# Patient Record
Sex: MALE | Race: WHITE | URBAN - METROPOLITAN AREA
[De-identification: names, ages, dates, MRNs, and addresses within clinical notes are randomized per-mention and may not be internally consistent; named-entity substitution may affect disease eponyms.]

---

## 2020-03-09 ENCOUNTER — OFFICE VISIT (OUTPATIENT)
Dept: FAMILY MEDICINE CLINIC | Age: 70
End: 2020-03-09

## 2020-03-09 ENCOUNTER — HOSPITAL ENCOUNTER (OUTPATIENT)
Dept: LAB | Age: 70
Discharge: HOME | End: 2020-03-09
Payer: MEDICARE

## 2020-03-09 VITALS
HEIGHT: 62 IN | SYSTOLIC BLOOD PRESSURE: 147 MMHG | DIASTOLIC BLOOD PRESSURE: 78 MMHG | TEMPERATURE: 97 F | HEART RATE: 63 BPM | BODY MASS INDEX: 34.96 KG/M2 | OXYGEN SATURATION: 97 % | RESPIRATION RATE: 16 BRPM | WEIGHT: 190 LBS

## 2020-03-09 DIAGNOSIS — Z13.220 LIPID SCREENING: ICD-10-CM

## 2020-03-09 DIAGNOSIS — I10 ESSENTIAL HYPERTENSION: ICD-10-CM

## 2020-03-09 DIAGNOSIS — I10 ESSENTIAL HYPERTENSION: Primary | ICD-10-CM

## 2020-03-09 DIAGNOSIS — K21.9 GASTROESOPHAGEAL REFLUX DISEASE, ESOPHAGITIS PRESENCE NOT SPECIFIED: ICD-10-CM

## 2020-03-09 DIAGNOSIS — Z12.11 SCREEN FOR COLON CANCER: ICD-10-CM

## 2020-03-09 LAB
ALBUMIN SERPL-MCNC: 3.6 G/DL (ref 3.4–5)
ALBUMIN/GLOB SERPL: 1 {RATIO} (ref 0.8–1.7)
ALP SERPL-CCNC: 78 U/L (ref 45–117)
ALT SERPL-CCNC: 57 U/L (ref 16–61)
ANION GAP SERPL CALC-SCNC: 3 MMOL/L (ref 3–18)
AST SERPL-CCNC: 35 U/L (ref 10–38)
BILIRUB SERPL-MCNC: 0.4 MG/DL (ref 0.2–1)
BUN SERPL-MCNC: 16 MG/DL (ref 7–18)
BUN/CREAT SERPL: 12 (ref 12–20)
CALCIUM SERPL-MCNC: 8.9 MG/DL (ref 8.5–10.1)
CHLORIDE SERPL-SCNC: 103 MMOL/L (ref 100–111)
CHOLEST SERPL-MCNC: 181 MG/DL
CO2 SERPL-SCNC: 31 MMOL/L (ref 21–32)
CREAT SERPL-MCNC: 1.35 MG/DL (ref 0.6–1.3)
GLOBULIN SER CALC-MCNC: 3.6 G/DL (ref 2–4)
GLUCOSE SERPL-MCNC: 91 MG/DL (ref 74–99)
HDLC SERPL-MCNC: 37 MG/DL (ref 40–60)
HDLC SERPL: 4.9 {RATIO} (ref 0–5)
LDLC SERPL CALC-MCNC: 114 MG/DL (ref 0–100)
LIPID PROFILE,FLP: ABNORMAL
POTASSIUM SERPL-SCNC: 4.4 MMOL/L (ref 3.5–5.5)
PROT SERPL-MCNC: 7.2 G/DL (ref 6.4–8.2)
SODIUM SERPL-SCNC: 137 MMOL/L (ref 136–145)
TRIGL SERPL-MCNC: 150 MG/DL (ref ?–150)
VLDLC SERPL CALC-MCNC: 30 MG/DL

## 2020-03-09 PROCEDURE — 80061 LIPID PANEL: CPT

## 2020-03-09 PROCEDURE — 36415 COLL VENOUS BLD VENIPUNCTURE: CPT

## 2020-03-09 PROCEDURE — 80053 COMPREHEN METABOLIC PANEL: CPT

## 2020-03-09 RX ORDER — HYDROCHLOROTHIAZIDE 25 MG/1
TABLET ORAL
COMMUNITY
Start: 2020-01-05 | End: 2020-03-09 | Stop reason: SDUPTHER

## 2020-03-09 RX ORDER — OMEPRAZOLE 40 MG/1
CAPSULE, DELAYED RELEASE ORAL
COMMUNITY
Start: 2020-01-05 | End: 2020-03-09 | Stop reason: SDUPTHER

## 2020-03-09 RX ORDER — HYDROCHLOROTHIAZIDE 25 MG/1
25 TABLET ORAL DAILY
Qty: 90 TAB | Refills: 1 | Status: SHIPPED | OUTPATIENT
Start: 2020-03-09 | End: 2020-06-09 | Stop reason: SDUPTHER

## 2020-03-09 RX ORDER — LISINOPRIL 40 MG/1
TABLET ORAL
COMMUNITY
Start: 2019-12-05 | End: 2020-03-09 | Stop reason: SDUPTHER

## 2020-03-09 RX ORDER — LISINOPRIL 40 MG/1
40 TABLET ORAL DAILY
Qty: 90 TAB | Refills: 1 | Status: SHIPPED | OUTPATIENT
Start: 2020-03-09 | End: 2020-06-09 | Stop reason: SDUPTHER

## 2020-03-09 RX ORDER — OMEPRAZOLE 40 MG/1
40 CAPSULE, DELAYED RELEASE ORAL DAILY
Qty: 90 CAP | Refills: 1 | Status: SHIPPED | OUTPATIENT
Start: 2020-03-09 | End: 2020-06-09 | Stop reason: SDUPTHER

## 2020-03-09 NOTE — PATIENT INSTRUCTIONS
DASH Diet: Care Instructions  Your Care Instructions    The DASH diet is an eating plan that can help lower your blood pressure. DASH stands for Dietary Approaches to Stop Hypertension. Hypertension is high blood pressure. The DASH diet focuses on eating foods that are high in calcium, potassium, and magnesium. These nutrients can lower blood pressure. The foods that are highest in these nutrients are fruits, vegetables, low-fat dairy products, nuts, seeds, and legumes. But taking calcium, potassium, and magnesium supplements instead of eating foods that are high in those nutrients does not have the same effect. The DASH diet also includes whole grains, fish, and poultry. The DASH diet is one of several lifestyle changes your doctor may recommend to lower your high blood pressure. Your doctor may also want you to decrease the amount of sodium in your diet. Lowering sodium while following the DASH diet can lower blood pressure even further than just the DASH diet alone. Follow-up care is a key part of your treatment and safety. Be sure to make and go to all appointments, and call your doctor if you are having problems. It's also a good idea to know your test results and keep a list of the medicines you take. How can you care for yourself at home? Following the DASH diet  · Eat 4 to 5 servings of fruit each day. A serving is 1 medium-sized piece of fruit, ½ cup chopped or canned fruit, 1/4 cup dried fruit, or 4 ounces (½ cup) of fruit juice. Choose fruit more often than fruit juice. · Eat 4 to 5 servings of vegetables each day. A serving is 1 cup of lettuce or raw leafy vegetables, ½ cup of chopped or cooked vegetables, or 4 ounces (½ cup) of vegetable juice. Choose vegetables more often than vegetable juice. · Get 2 to 3 servings of low-fat and fat-free dairy each day. A serving is 8 ounces of milk, 1 cup of yogurt, or 1 ½ ounces of cheese. · Eat 6 to 8 servings of grains each day.  A serving is 1 slice of bread, 1 ounce of dry cereal, or ½ cup of cooked rice, pasta, or cooked cereal. Try to choose whole-grain products as much as possible. · Limit lean meat, poultry, and fish to 2 servings each day. A serving is 3 ounces, about the size of a deck of cards. · Eat 4 to 5 servings of nuts, seeds, and legumes (cooked dried beans, lentils, and split peas) each week. A serving is 1/3 cup of nuts, 2 tablespoons of seeds, or ½ cup of cooked beans or peas. · Limit fats and oils to 2 to 3 servings each day. A serving is 1 teaspoon of vegetable oil or 2 tablespoons of salad dressing. · Limit sweets and added sugars to 5 servings or less a week. A serving is 1 tablespoon jelly or jam, ½ cup sorbet, or 1 cup of lemonade. · Eat less than 2,300 milligrams (mg) of sodium a day. If you limit your sodium to 1,500 mg a day, you can lower your blood pressure even more. Tips for success  · Start small. Do not try to make dramatic changes to your diet all at once. You might feel that you are missing out on your favorite foods and then be more likely to not follow the plan. Make small changes, and stick with them. Once those changes become habit, add a few more changes. · Try some of the following:  ? Make it a goal to eat a fruit or vegetable at every meal and at snacks. This will make it easy to get the recommended amount of fruits and vegetables each day. ? Try yogurt topped with fruit and nuts for a snack or healthy dessert. ? Add lettuce, tomato, cucumber, and onion to sandwiches. ? Combine a ready-made pizza crust with low-fat mozzarella cheese and lots of vegetable toppings. Try using tomatoes, squash, spinach, broccoli, carrots, cauliflower, and onions. ? Have a variety of cut-up vegetables with a low-fat dip as an appetizer instead of chips and dip. ? Sprinkle sunflower seeds or chopped almonds over salads. Or try adding chopped walnuts or almonds to cooked vegetables.   ? Try some vegetarian meals using beans and peas. Add garbanzo or kidney beans to salads. Make burritos and tacos with mashed gonzalez beans or black beans. Where can you learn more? Go to http://crys-augusta.info/. Enter N946 in the search box to learn more about \"DASH Diet: Care Instructions. \"  Current as of: April 9, 2019  Content Version: 12.2  © 8099-0805 [employer]. Care instructions adapted under license by [employer] (which disclaims liability or warranty for this information). If you have questions about a medical condition or this instruction, always ask your healthcare professional. [address] any warranty or liability for your use of this information.

## 2020-03-09 NOTE — PROGRESS NOTES
[address] [place] [address]               [phone]      Preeti Figueroa is a 78 y.o. male and presents with Establish Care and Medication Refill       Assessment/Plan:    Diagnoses and all orders for this visit:    1. Essential hypertension  -     hydroCHLOROthiazide (HYDRODIURIL) 25 mg tablet; Take 1 Tab by mouth daily. -     lisinopriL (PRINIVIL, ZESTRIL) 40 mg tablet; Take 1 Tab by mouth daily.  -     METABOLIC PANEL, COMPREHENSIVE; Future  Here today to establish care and address acute and chronic medical conditions  He did run out of his medications and missed a couple days, states he borrowed a friend's lisinopril, states that lisinopril was 5 mg tablets therefore he took 8 of them  Blood pressure today 147/78, will continue to monitor at this time  Medication refills given  Labs ordered to monitor hepatic and renal functions    2. Gastroesophageal reflux disease, esophagitis presence not specified  -     omeprazole (PRILOSEC) 40 mg capsule; Take 1 Cap by mouth daily. Medication refill given    3. Screen for colon cancer  -     REFERRAL TO GASTROENTEROLOGY  He is due for a colon cancer screening, referral to gastroenterology ordered    4. Lipid screening  -     LIPID PANEL; Future  Due for lipid screening, labs ordered      Follow-up and Dispositions    · Return in about 3 months (around 6/9/2020) for Medicare annual wellness exam, hypertension, 30 minutes. Subjective:    Was previously living in [place]. Wife recently passed away, so he moved back to [place] where he has family for support. Hypertension:   Patient reports taking medications as instructed. yes   Medication side effects noted. no  Headache upon wakening. no   Home BP monitoring in range of 301/96'T systolic. Denies chest pain/pressure or SOB with exertion. ROS:     Review of Systems   Constitutional: Negative.     HENT:        Wears Dentures   Eyes:        Wear corrective lenses   Respiratory: Negative. Cardiovascular: Negative. Gastrointestinal: Negative. PMH: GERD  Last colonoscopy >10 years   Genitourinary: Positive for frequency. Urinates about every two hours, empties his bladder completely, does not interfere with his daily life   Musculoskeletal: Positive for back pain. 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 Neurological: Negative. Psychiatric/Behavioral: Negative. The problem list was updated as a part of today's visit. Patient Active Problem List   Diagnosis Code    Hypertension I10    GERD (gastroesophageal reflux disease) K21.9       The PSH, FH were reviewed. SH:  Social History     Tobacco Use    Smoking status: Former Smoker     Packs/day: 2.00     Years: 25.00     Pack years: 50.00   Substance Use Topics    Alcohol use: Yes     Comment: Juan Edgar     Drug use: Not Currently       Medications/Allergies:  Current Outpatient Medications on File Prior to Visit   Medication Sig Dispense Refill    [DISCONTINUED] hydroCHLOROthiazide (HYDRODIURIL) 25 mg tablet       [DISCONTINUED] lisinopriL (PRINIVIL, ZESTRIL) 40 mg tablet       [DISCONTINUED] omeprazole (PRILOSEC) 40 mg capsule        No current facility-administered medications on file prior to visit. Not on File    Objective:  Visit Vitals  /78   Pulse 63   Temp 97 °F (36.1 °C) (Oral)   Resp 16   Ht 5' 2\" (1.575 m)   Wt 190 lb (86.2 kg)   SpO2 97%   BMI 34.75 kg/m²    Body mass index is 34.75 kg/m². Physical assessment  Physical Exam  Vitals signs and nursing note reviewed. Constitutional:       Appearance: Normal appearance. HENT:      Head: Normocephalic and atraumatic.       Right Ear: Hearing, tympanic membrane, ear canal and external ear normal.      Left Ear: Hearing, tympanic membrane, ear canal and external ear normal. Nose: Nose normal.      Mouth/Throat:      Pharynx: Uvula midline. Eyes:      General: Lids are normal.      Conjunctiva/sclera: Conjunctivae normal.      Pupils: Pupils are equal, round, and reactive to light. Neck:      Musculoskeletal: Normal range of motion and neck supple. Thyroid: No thyroid mass or thyromegaly. Vascular: No carotid bruit or JVD. Trachea: Trachea normal. No tracheal deviation. Cardiovascular:      Rate and Rhythm: Normal rate and regular rhythm. Heart sounds: Normal heart sounds, S1 normal and S2 normal.   Pulmonary:      Effort: Pulmonary effort is normal.      Breath sounds: Normal breath sounds. Abdominal:      General: Bowel sounds are normal.      Palpations: Abdomen is soft. Tenderness: There is no abdominal tenderness. Musculoskeletal: Normal range of motion. Lymphadenopathy:      Head:      Right side of head: No submental, submandibular, tonsillar, preauricular, posterior auricular or occipital adenopathy. Left side of head: No submental, submandibular, tonsillar, preauricular, posterior auricular or occipital adenopathy. Cervical: No cervical adenopathy. Skin:     General: Skin is warm and dry. Neurological:      Mental Status: He is alert and oriented to person, place, and time. Motor: Motor function is intact. Gait: Gait is intact.    Psychiatric:         Mood and Affect: Mood and affect normal.         Cognition and Memory: Memory normal.         Judgment: Judgment normal.           Labwork and Ancillary Studies:    CBC w/Diff  No results found for: WBC, WBCLT, HGB, HGBP, PLT, HGBEXT, PLTEXT, HGBEXT, PLTEXT      Basic Metabolic Profile  Lab Results   Component Value Date/Time    Sodium 137 03/09/2020 10:23 AM    Potassium 4.4 03/09/2020 10:23 AM    Chloride 103 03/09/2020 10:23 AM    CO2 31 03/09/2020 10:23 AM    Anion gap 3 03/09/2020 10:23 AM    Glucose 91 03/09/2020 10:23 AM    BUN 16 03/09/2020 10:23 AM    Creatinine 1.35 (H) 03/09/2020 10:23 AM    BUN/Creatinine ratio 12 03/09/2020 10:23 AM    GFR est AA >60 03/09/2020 10:23 AM    GFR est non-AA 52 (L) 03/09/2020 10:23 AM    Calcium 8.9 03/09/2020 10:23 AM        Cholesterol  Lab Results   Component Value Date/Time    Cholesterol, total 181 03/09/2020 10:23 AM    HDL Cholesterol 37 (L) 03/09/2020 10:23 AM    LDL, calculated 114 (H) 03/09/2020 10:23 AM    Triglyceride 150 (H) 03/09/2020 10:23 AM    CHOL/HDL Ratio 4.9 03/09/2020 10:23 AM       Health Maintenance:   Health Maintenance   Topic Date Due    Hepatitis C Screening  1950    DTaP/Tdap/Td series (1 - Tdap) 12/16/1960    Lipid Screen  12/16/1989    Shingrix Vaccine Age 50> (1 of 2) 12/16/1999    FOBT Q1Y Age 54-65  12/16/1999    GLAUCOMA SCREENING Q2Y  12/16/2014    AAA Screening 73-67 YO Male Smoking Patients  12/16/2014    Pneumococcal 65+ years (1 of 1 - PPSV23) 12/16/2014    Influenza Age 5 to Adult  08/01/2019    Medicare Yearly Exam  03/09/2020       I have discussed the diagnosis with the patient and the intended plan as seen in the above orders. The patient has received an After-Visit Summary and questions were answered concerning future plans. An After Visit Summary was printed and given to the patient. All diagnosis have been discussed with the patient and all of the patient's questions have been answered. Follow-up and Dispositions    · Return in about 3 months (around 6/9/2020) for Medicare annual wellness exam, hypertension, 30 minutes. Rika Garcia, LIBIAP-BC  [address] [facility]   [address]  [address]

## 2020-03-09 NOTE — PROGRESS NOTES
Chief Complaint   Patient presents with   [facility] Establish Care    Medication Refill     1. Have you been to the ER, urgent care clinic since your last visit? Hospitalized since your last visit? 2. Have you seen or consulted any other health care providers outside of the [address] [place] [address] since your last visit? Include any pap smears or colon screening.

## 2020-06-09 ENCOUNTER — VIRTUAL VISIT (OUTPATIENT)
Dept: FAMILY MEDICINE CLINIC | Age: 70
End: 2020-06-09

## 2020-06-09 DIAGNOSIS — Z71.89 ADVANCED CARE PLANNING/COUNSELING DISCUSSION: ICD-10-CM

## 2020-06-09 DIAGNOSIS — Z00.00 MEDICARE ANNUAL WELLNESS VISIT, INITIAL: Primary | ICD-10-CM

## 2020-06-09 DIAGNOSIS — Z13.6 SCREENING FOR CARDIOVASCULAR CONDITION: ICD-10-CM

## 2020-06-09 DIAGNOSIS — K21.9 GASTROESOPHAGEAL REFLUX DISEASE, ESOPHAGITIS PRESENCE NOT SPECIFIED: ICD-10-CM

## 2020-06-09 DIAGNOSIS — I10 ESSENTIAL HYPERTENSION: ICD-10-CM

## 2020-06-09 RX ORDER — OMEPRAZOLE 40 MG/1
40 CAPSULE, DELAYED RELEASE ORAL DAILY
Qty: 90 CAP | Refills: 1 | Status: SHIPPED | OUTPATIENT
Start: 2020-06-09 | End: 2021-02-08 | Stop reason: SDUPTHER

## 2020-06-09 RX ORDER — HYDROCHLOROTHIAZIDE 25 MG/1
25 TABLET ORAL DAILY
Qty: 90 TAB | Refills: 1 | Status: SHIPPED | OUTPATIENT
Start: 2020-06-09 | End: 2021-03-08 | Stop reason: SDUPTHER

## 2020-06-09 RX ORDER — LISINOPRIL 40 MG/1
40 TABLET ORAL DAILY
Qty: 90 TAB | Refills: 1 | Status: SHIPPED | OUTPATIENT
Start: 2020-06-09 | End: 2021-02-08 | Stop reason: SDUPTHER

## 2020-06-09 NOTE — PROGRESS NOTES
Johnny               [address]               [phone]      Cem Alexander is a 78 y.o. male who was seen by synchronous (real-time) audio-video technology on 6/9/2020. Consent: Cem Alexander, who was seen by synchronous (real-time) audio-video technology, and/or his healthcare decision maker, is aware that this patient-initiated, Telehealth encounter on 6/9/2020 is a billable service, with coverage as determined by his insurance carrier. He is aware that he may receive a bill and has provided verbal consent to proceed: Yes. Assessment & Plan:   Diagnoses and all orders for this visit:    1. Medicare annual wellness visit, initial  Completed today to include ETOH and depression screening    2. Advanced care planning/counseling discussion  Discussion completed, recommend completion of ACP    3. Essential hypertension  -     hydroCHLOROthiazide (HYDRODIURIL) 25 mg tablet; Take 1 Tab by mouth daily. -     lisinopriL (PRINIVIL, ZESTRIL) 40 mg tablet; Take 1 Tab by mouth daily. Stable per home readings  Continue same med  Refills provided    4. Gastroesophageal reflux disease, esophagitis presence not specified  -     omeprazole (PRILOSEC) 40 mg capsule; Take 1 Cap by mouth daily. GERD controlled with prilosec  Refill provided    5. Screening for cardiovascular condition  -     US EXAM SCREENING AAA; Future  Prior smoker, AAA screen health maintenance need address  [address] [place] [address],[address]d ordered    Follow-up and Dispositions    · Return in about 3 months (around 9/9/2020) for hypertension, hyperlipidemia, office visit, 30 minutes.        Health Maintenance Due    Topic Date Due     Hepatitis C Screening  1950 Next visit    Shingrix Vaccine Age 50> (1 of 2) 12/16/1999 Next visit    FOBT Q1Y Age 54-65  12/16/1999 Scheduled 7/15/20    GLAUCOMA SCREENING Q2Y  12/16/2014 delayed due to [place], he is checking when the eye exams are available    AAA Screening 73-69 YO Male Smoking Patients  12/16/2014 Ordered today    Medicare Yearly Exam  03/09/2020 Completed today    Pneumococcal 65+ years (2 of 2 - PPSV23) 04/07/2020 Next visit        712  Subjective:   Chicho Doran is a 78 y.o. male who was seen for   Follow Up Chronic Condition; Hypertension; and GERD    Hypertension:   Patient reports taking medications as instructed. yes   Medication side effects noted. no  Headache upon wakening. no   Home BP monitoring in range of 986/70'H systolic. Do you experience chest pain/pressure or SOB with exertion? no  Maintain a low salt diet? yes  Key CAD CHF Meds             hydroCHLOROthiazide (HYDRODIURIL) 25 mg tablet (Taking) Take 1 Tab by mouth daily. lisinopriL (PRINIVIL, ZESTRIL) 40 mg tablet (Taking) Take 1 Tab by mouth daily. Prior to Admission medications    Medication Sig Start Date End Date Taking? Authorizing Provider   hydroCHLOROthiazide (HYDRODIURIL) 25 mg tablet Take 1 Tab by mouth daily. 6/9/20  Yes Urban Mesa NP   lisinopriL (PRINIVIL, ZESTRIL) 40 mg tablet Take 1 Tab by mouth daily. 6/9/20  Yes Urban Mesa NP   omeprazole (PRILOSEC) 40 mg capsule Take 1 Cap by mouth daily. 6/9/20  Yes Urban Mesa NP   hydroCHLOROthiazide (HYDRODIURIL) 25 mg tablet Take 1 Tab by mouth daily. 3/9/20 6/9/20  Urban Mesa NP   lisinopriL (PRINIVIL, ZESTRIL) 40 mg tablet Take 1 Tab by mouth daily. 3/9/20 6/9/20  Urban Mesa NP   omeprazole (PRILOSEC) 40 mg capsule Take 1 Cap by mouth daily.  3/9/20 6/9/20  Urban Mesa NP     No Known Allergies    Patient Active Problem List   Diagnosis Code    Hypertension I10    GERD (gastroesophageal reflux disease) K21.9     Patient Active Problem List    Diagnosis Date Noted    Hypertension 03/09/2020    GERD (gastroesophageal reflux disease) 03/09/2020     Current Outpatient Medications   Medication Sig Dispense Refill    hydroCHLOROthiazide (HYDRODIURIL) 25 mg tablet Take 1 Tab by mouth daily. 90 Tab 1    lisinopriL (PRINIVIL, ZESTRIL) 40 mg tablet Take 1 Tab by mouth daily. 90 Tab 1    omeprazole (PRILOSEC) 40 mg capsule Take 1 Cap by mouth daily. 90 Cap 1     No Known Allergies  Past Medical History:   Diagnosis Date    GERD (gastroesophageal reflux disease)     Hypertension      Past Surgical History:   Procedure Laterality Date    HX ORTHOPAEDIC      knee rt 2018     Family History   Problem Relation Age of Onset    No Known Problems Mother     No Known Problems Father      Social History     Tobacco Use    Smoking status: Former Smoker     Packs/day: 2.00     Years: 25.00     Pack years: 50.00    Smokeless tobacco: Never Used   Substance Use Topics    Alcohol use: Yes     Comment: Juan Edgar        ROS  As stated in HPI, otherwise all others negative. Objective: There were no vitals taken for this visit. General: alert, cooperative, no distress   Mental  status: normal mood, behavior, speech, dress, motor activity, and thought processes, able to follow commands   HENT: NCAT   Neck: no visualized mass   Resp: no respiratory distress   Neuro: no gross deficits   Skin: no discoloration or lesions of concern on visible areas   Psychiatric: normal affect, consistent with stated mood, no evidence of hallucinations     Additional exam findings: We discussed the expected course, resolution and complications of the diagnosis(es) in detail. Medication risks, benefits, costs, interactions, and alternatives were discussed as indicated. I advised him to contact the office if his condition worsens, changes or fails to improve as anticipated. He expressed understanding with the diagnosis(es) and plan. Antonia Camargo is a 78 y.o. male who was evaluated by a video visit encounter for concerns as above. Patient identification was verified prior to start of the visit. A caregiver was present when appropriate.  Due to this being a TeleHealth encounter (During [facility]-28 public health emergency), evaluation of the following organ systems was limited: Vitals/Constitutional/EENT/Resp/CV/GI//MS/Neuro/Skin/Heme-Lymph-Imm. Pursuant to the emergency declaration under the [facility], [facility] waiver authority and the [employer] and [employer] Act, this Virtual  Visit was conducted, with patient's (and/or legal guardian's) consent, to reduce the patient's risk of exposure to COVID-19 and provide necessary medical care. Services were provided through a video synchronous discussion virtually to substitute for in-person clinic visit. Patient and provider were located at their individual homes. An After Visit Summary was printed and given to the patient. All diagnosis have been discussed with the patient and all of the patient's questions have been answered. Follow-up and Dispositions    · Return in about 3 months (around 9/9/2020) for hypertension, hyperlipidemia, office visit, 30 minutes. Gerson Villegas [address]    This is an Initial Medicare Annual Wellness Exam (AWV) (Performed 12 months after IPPE or effective date of Medicare Part B enrollment, Once in a lifetime)    Consent: Guilherme Navarrete, who was seen by synchronous (real-time) audio-video technology, and/or his healthcare decision maker, is aware that this patient-initiated, Telehealth encounter on 6/9/2020 is a billable service. While AWVs are fully covered by Medicare, any services rendered on this date that are not included in an AWV are subject to additional billing, with coverage as determined by his insurance carrier. He is aware that he may receive a bill for any such additional services and has provided verbal consent to proceed: Yes. I have reviewed the patient's medical history in detail and updated the computerized patient record. History     Patient Active Problem List   Diagnosis Code    Hypertension I10    GERD (gastroesophageal reflux disease) K21.9     Past Medical History:   Diagnosis Date    GERD (gastroesophageal reflux disease)     Hypertension       Past Surgical History:   Procedure Laterality Date    HX ORTHOPAEDIC      knee rt 2018     Current Outpatient Medications   Medication Sig Dispense Refill    hydroCHLOROthiazide (HYDRODIURIL) 25 mg tablet Take 1 Tab by mouth daily. 90 Tab 1    lisinopriL (PRINIVIL, ZESTRIL) 40 mg tablet Take 1 Tab by mouth daily. 90 Tab 1    omeprazole (PRILOSEC) 40 mg capsule Take 1 Cap by mouth daily. 90 Cap 1     No Known Allergies    Family History   Problem Relation Age of Onset    No Known Problems Mother     No Known Problems Father      Social History     Tobacco Use    Smoking status: Former Smoker     Packs/day: 2.00     Years: 25.00     Pack years: 50.00    Smokeless tobacco: Never Used   Substance Use Topics    Alcohol use: Yes     Comment: Juan Edgar        Depression Risk Factor Screening:     3 most recent PHQ Screens 6/9/2020   Little interest or pleasure in doing things Not at all   Feeling down, depressed, irritable, or hopeless Not at all   Total Score PHQ 2 0       Alcohol Risk Factor Screening (MALE > 65): Do you average more 1 drink per night or more than 7 drinks a week: Yes    In the past three months have you have had more than 4 drinks containing alcohol on one occasion: No      Functional Ability and Level of Safety:   Hearing: Hearing is good. Activities of Daily Living: The home contains: handrails and grab bars  Patient does total self care     Ambulation: with no difficulty    Fall Risk:  Fall Risk Assessment, last 12 mths 6/9/2020   Able to walk? Yes   Fall in past 12 months?  No       Abuse Screen:   Patient is not abused    Cognitive Screening   Has your family/caregiver stated any concerns about your memory: no  Cognitive Screening: Normal - Clock Drawing Test             Patient Care Team   Patient Care Team:  Pieter Ellis NP as PCP - General (Nurse Practitioner)  Pieter Ellis NP [facility] PCP [facility]    Assessment/Plan   Education and counseling provided:  Are appropriate based on today's review and evaluation  End-of-Life planning (with patient's consent)    Diagnoses and all orders for this visit:    1. Medicare annual wellness visit, initial    2. Advanced care planning/counseling discussion    3. Essential hypertension  -     hydroCHLOROthiazide (HYDRODIURIL) 25 mg tablet; Take 1 Tab by mouth daily. -     lisinopriL (PRINIVIL, ZESTRIL) 40 mg tablet; Take 1 Tab by mouth daily. 4. Gastroesophageal reflux disease, esophagitis presence not specified  -     omeprazole (PRILOSEC) 40 mg capsule; Take 1 Cap by mouth daily. 5. Screening for cardiovascular condition  -     US EXAM SCREENING AAA; Future         Health Maintenance Due   Topic Date Due    Hepatitis C Screening  1950    Shingrix Vaccine Age 50> (1 of 2) 12/16/1999    FOBT Q1Y Age 54-65  12/16/1999    GLAUCOMA SCREENING Q2Y  12/16/2014    AAA Screening 73-67 YO Male Smoking Patients  12/16/2014    Medicare Yearly Exam  03/09/2020    Pneumococcal 65+ years (2 of 2 - PPSV23) 04/07/2020         Frank Cash is a 78 y.o. male who was evaluated by an audio-video encounter for concerns as above. Patient identification was verified prior to start of the visit. A caregiver was present when appropriate. Due to this being a TeleHealth encounter (During [place] [address] public health emergency), evaluation of the following organ systems was limited: Vitals/Constitutional/EENT/Resp/CV/GI//MS/Neuro/Skin/Heme-Lymph-Imm.   Pursuant to the emergency declaration under the [facility], [facility]5 waiver authority and the [employer] and [employer] Act, this Virtual Visit was conducted, with patient's (and/or legal guardian's) consent, to reduce the patient's risk of exposure to COVID-19 and provide necessary medical care. Services were provided through a synchronous discussion virtually to substitute for in-person clinic visit. I was in the office. The patient was at home.         Yuan Turk, SHANIQUA

## 2020-06-09 NOTE — ACP (ADVANCE CARE PLANNING)
Advance Care Planning       Advance Care Planning (ACP) Physician/NP/PA (Provider) Conversation        Date of ACP Conversation: 6/9/2020    Conversation Conducted with:   Patient with [address] Maker:    Current Designated Health Care Decision Maker:   Primary Decision Maker: Cristopher Urias - Other Relative - [phone]  (If there is a [address] named in the [address] [facility] Makers\" box in the ACP activity, but it is not visible above, be sure to open that field and then select the health care decision maker relationship (ie \"primary\") in the blank space to the right of the name.)    Note: Assess and validate information in current ACP documents, as indicated. Note: If the relationship of these Decision-Makers to the patient does NOT follow your state's Next of Kin hierarchy, recommend that patient complete ACP document that meets state-specific requirements to allow them to act on the patient's behalf when appropriate. Care Preferences:    Hospitalization: \"If your health worsens and it becomes clear that your chance of recovery is unlikely, what would your preference be regarding hospitalization? \"  If the patient would want hospitalization, answer \"yes\". If the patient would prefer comfort-focused treatment without hospitalization, answer \"no\". no      Ventilation: \"If you were in your present state of health and suddenly became very ill and were unable to breathe on your own, what would your preference be about the use of a ventilator (breathing machine) if it was available to you? \"    If patient would desire the use of a ventilator (breathing machine), answer \"yes\", if not answer \"no\":yes    \"If your health worsens and it becomes clear that your chance of recovery is unlikely, what would your preference be about the use of a ventilator (breathing machine) if it was available to you? \"   no      Resuscitation:  \"CPR works best to restart the heart when there is a sudden event, like a heart attack, in someone who is otherwise healthy. Unfortunately, CPR does not typically restart the heart for people who have serious health conditions or who are very sick. \"    \"In the event your heart stopped as a result of an underlying serious health condition, would you want attempts to be made to restart your heart (answer \"yes\" for attempt to resuscitate) or would you prefer a natural death (answer \"no\" for do not attempt to resuscitate)? \"   yes    NOTE: If the patient has a valid advance directive AND provides care preference(s) that are inconsistent with that prior directive, advise the patient to consider either: creating a new advance directive that complies with state-specific requirements; or, if that is not possible, orally revoking that prior directive in accordance with state-specific requirements, which must be documented in the EHR.     Conversation Outcomes / Follow-Up Plan:   Recommended completion of Advance Directive      Length of Voluntary ACP Conversation in minutes:  <16 minutes (Non-Billable)      Katia Frias NP

## 2020-06-09 NOTE — PROGRESS NOTES
1. Have you been to the ER, urgent care clinic since your last visit? Hospitalized since your last visit? No    2. Have you seen or consulted any other health care providers outside of the [address] [place] [address] since your last visit? Include any pap smears or colon screening.  No

## 2020-06-09 NOTE — PATIENT INSTRUCTIONS
Medicare Wellness Visit, Male The best way to live healthy is to have a lifestyle where you eat a well-balanced diet, exercise regularly, limit alcohol use, and quit all forms of tobacco/nicotine, if applicable. Regular preventive services are another way to keep healthy. Preventive services (vaccines, screening tests, monitoring & exams) can help personalize your care plan, which helps you manage your own care. Screening tests can find health problems at the earliest stages, when they are easiest to treat. [facility] follows the current, evidence-based guidelines published by the [facility] Angel Chaz ([facility]) when recommending preventive services for our patients. Because we follow these guidelines, sometimes recommendations change over time as research supports it. (For example, a prostate screening blood test is no longer routinely recommended for men with no symptoms). Of course, you and your doctor may decide to screen more often for some diseases, based on your risk and co-morbidities (chronic disease you are already diagnosed with). Preventive services for you include: - Medicare offers their members a free annual wellness visit, which is time for you and your primary care provider to discuss and plan for your preventive service needs. Take advantage of this benefit every year! 
-All adults over age 72 should receive the recommended pneumonia vaccines. Current USPSTF guidelines recommend a series of two vaccines for the best pneumonia protection.  
-All adults should have a flu vaccine yearly and tetanus vaccine every 10 years. 
-All adults age 48 and older should receive the shingles vaccines (series of two vaccines).       
-All adults age 38-68 who are overweight should have a diabetes screening test once every three years.  
-Other screening tests & preventive services for persons with diabetes include: an eye exam to screen for diabetic retinopathy, a kidney function test, a foot exam, and stricter control over your cholesterol.  
-Cardiovascular screening for adults with routine risk involves an electrocardiogram (ECG) at intervals determined by the provider.  
-Colorectal cancer screening should be done for adults age 54-65 with no increased risk factors for colorectal cancer. There are a number of acceptable methods of screening for this type of cancer. Each test has its own benefits and drawbacks. Discuss with your provider what is most appropriate for you during your annual wellness visit. The different tests include: colonoscopy (considered the best screening method), a fecal occult blood test, a fecal DNA test, and sigmoidoscopy. 
-All adults born between [facility] should be screened once for Hepatitis C. 
-An Abdominal Aortic Aneurysm (AAA) Screening is recommended for men age 73-68 who has ever smoked in their lifetime. Here is a list of your current Health Maintenance items (your personalized list of preventive services) with a due date: 
Health Maintenance Due Topic Date Due  
 Hepatitis C Test  1950  Shingles Vaccine (1 of 2) 12/16/1999  Colon Cancer Stool Test  12/16/1999  Glaucoma Screening   12/16/2014  AAA Screening  12/16/2014 [address] [place] [address] Annual Well Visit  03/09/2020  Pneumococcal Vaccine (2 of 2 - PPSV23) 04/07/2020 Medicare Wellness Visit, Male The best way to live healthy is to have a lifestyle where you eat a well-balanced diet, exercise regularly, limit alcohol use, and quit all forms of tobacco/nicotine, if applicable. Regular preventive services are another way to keep healthy. Preventive services (vaccines, screening tests, monitoring & exams) can help personalize your care plan, which helps you manage your own care. Screening tests can find health problems at the earliest stages, when they are easiest to treat. [facility] follows the current, evidence-based guidelines published by the [facility] States Angel Ware ([facility]) when recommending preventive services for our patients. Because we follow these guidelines, sometimes recommendations change over time as research supports it. (For example, a prostate screening blood test is no longer routinely recommended for men with no symptoms). Of course, you and your doctor may decide to screen more often for some diseases, based on your risk and co-morbidities (chronic disease you are already diagnosed with). Preventive services for you include: - Medicare offers their members a free annual wellness visit, which is time for you and your primary care provider to discuss and plan for your preventive service needs. Take advantage of this benefit every year! 
-All adults over age 72 should receive the recommended pneumonia vaccines. Current USPSTF guidelines recommend a series of two vaccines for the best pneumonia protection.  
-All adults should have a flu vaccine yearly and tetanus vaccine every 10 years. 
-All adults age 48 and older should receive the shingles vaccines (series of two vaccines). -All adults age 38-68 who are overweight should have a diabetes screening test once every three years.  
-Other screening tests & preventive services for persons with diabetes include: an eye exam to screen for diabetic retinopathy, a kidney function test, a foot exam, and stricter control over your cholesterol.  
-Cardiovascular screening for adults with routine risk involves an electrocardiogram (ECG) at intervals determined by the provider.  
-Colorectal cancer screening should be done for adults age 54-65 with no increased risk factors for colorectal cancer. There are a number of acceptable methods of screening for this type of cancer. Each test has its own benefits and drawbacks.  Discuss with your provider what is most appropriate for you during your annual wellness visit. The different tests include: colonoscopy (considered the best screening method), a fecal occult blood test, a fecal DNA test, and sigmoidoscopy. 
-All adults born between [facility] should be screened once for Hepatitis C. 
-An Abdominal Aortic Aneurysm (AAA) Screening is recommended for men age 73-68 who has ever smoked in their lifetime. Here is a list of your current Health Maintenance items (your personalized list of preventive services) with a due date: 
Health Maintenance Due Topic Date Due  
 Hepatitis C Test  1950  Shingles Vaccine (1 of 2) 12/16/1999  Colon Cancer Stool Test  12/16/1999  Glaucoma Screening   12/16/2014  AAA Screening  12/16/2014 [address] [place] [address] Annual Well Visit  03/09/2020  Pneumococcal Vaccine (2 of 2 - PPSV23) 04/07/2020

## 2020-09-09 ENCOUNTER — VIRTUAL VISIT (OUTPATIENT)
Dept: FAMILY MEDICINE CLINIC | Age: 70
End: 2020-09-09

## 2020-09-09 DIAGNOSIS — Z11.59 NEED FOR HEPATITIS C SCREENING TEST: ICD-10-CM

## 2020-09-09 DIAGNOSIS — Z12.11 COLON CANCER SCREENING: ICD-10-CM

## 2020-09-09 DIAGNOSIS — Z87.891 HISTORY OF TOBACCO USE: ICD-10-CM

## 2020-09-09 DIAGNOSIS — E78.00 ELEVATED LDL CHOLESTEROL LEVEL: ICD-10-CM

## 2020-09-09 DIAGNOSIS — I10 ESSENTIAL HYPERTENSION: Primary | ICD-10-CM

## 2020-09-09 NOTE — PROGRESS NOTES
Johnny               [address]               [phone]      Maxi Mendieta is a 78 y.o. male who was seen by synchronous (real-time) audio-video technology on 9/9/2020. Consent: Maxi Mendieta, who was seen by synchronous (real-time) audio-video technology, and/or his healthcare decision maker, is aware that this patient-initiated, Telehealth encounter on 9/9/2020 is a billable service, with coverage as determined by his insurance carrier. He is aware that he may receive a bill and has provided verbal consent to proceed: Yes. Assessment & Plan:   Diagnoses and all orders for this visit:    1. Essential hypertension  -     METABOLIC PANEL, COMPREHENSIVE; Future  Blood pressure stable per home checks  Have him come in for labs    2. History of tobacco use  Resend request to [facility] for AAA US screening    3. Elevated LDL cholesterol level  -     LIPID PANEL; Future  F/u lipid levels    4. Colon cancer screening  -     COLOGUARD TEST (FECAL DNA COLORECTAL CANCER SCREENING)  Health maintenance: colon cancer screening    5. Need for hepatitis C screening test  Order placed for hep c ab testing    Follow-up and Dispositions    · Return for ASAP, Labs, AND, 3 months, HTN, HLD, 30 min, office only. 712  Subjective:   Maxi Mendieta is a 78 y.o. male who was seen for   Hypertension and Cholesterol Problem  October 3rd is two years since wife passed. Hypertension:   Patient reports taking medications as instructed. yes   Medication side effects noted. no  Headache upon wakening. no   Home BP monitoring in range of 992/43'V systolic. Do you experience chest pain/pressure or SOB with exertion? no  Maintain a low salt diet? yes  Key CAD CHF Meds             hydroCHLOROthiazide (HYDRODIURIL) 25 mg tablet (Taking) Take 1 Tab by mouth daily. lisinopriL (PRINIVIL, ZESTRIL) 40 mg tablet (Taking) Take 1 Tab by mouth daily.         HLD:  Has been compliant with meds  N/A  Compliant with low-fat diet. most of the time    Denies myalgias or other side effects. N/A  Cholesterol, total   Date Value Ref Range Status   03/09/2020 181 <200 MG/DL Final     Triglyceride   Date Value Ref Range Status   03/09/2020 150 (H) <150 MG/DL Final     Comment:     The drugs N-acetylcysteine (NAC) and  Metamiszole have been found to cause falsely  low results in this chemical assay. Please  be sure to submit blood samples obtained  BEFORE administration of either of these  drugs to assure correct results. HDL Cholesterol   Date Value Ref Range Status   03/09/2020 37 (L) 40 - 60 MG/DL Final     LDL, calculated   Date Value Ref Range Status   03/09/2020 114 (H) 0 - 100 MG/DL Final   ]  Key Antihyperlipidemia Meds     The patient is on no antihyperlipidemia meds. Prior to Admission medications    Medication Sig Start Date End Date Taking? Authorizing Provider   hydroCHLOROthiazide (HYDRODIURIL) 25 mg tablet Take 1 Tab by mouth daily. 6/9/20  Yes Philipp Bailey, NP   lisinopriL (PRINIVIL, ZESTRIL) 40 mg tablet Take 1 Tab by mouth daily. 6/9/20  Yes Philipp Bailey, NP   omeprazole (PRILOSEC) 40 mg capsule Take 1 Cap by mouth daily. 6/9/20  Yes Philipp Bailey, NP     No Known Allergies    Patient Active Problem List   Diagnosis Code    Hypertension I10    GERD (gastroesophageal reflux disease) K21.9    History of tobacco use Z87.891     Patient Active Problem List    Diagnosis Date Noted    History of tobacco use 09/09/2020    Hypertension 03/09/2020    GERD (gastroesophageal reflux disease) 03/09/2020     Current Outpatient Medications   Medication Sig Dispense Refill    hydroCHLOROthiazide (HYDRODIURIL) 25 mg tablet Take 1 Tab by mouth daily. 90 Tab 1    lisinopriL (PRINIVIL, ZESTRIL) 40 mg tablet Take 1 Tab by mouth daily. 90 Tab 1    omeprazole (PRILOSEC) 40 mg capsule Take 1 Cap by mouth daily.  90 Cap 1     No Known Allergies  Past Medical History:   Diagnosis Date    GERD (gastroesophageal reflux disease)     Hypertension      Past Surgical History:   Procedure Laterality Date    HX ORTHOPAEDIC      knee rt 2018     Family History   Problem Relation Age of Onset    No Known Problems Mother     No Known Problems Father      Social History     Tobacco Use    Smoking status: Former Smoker     Packs/day: 2.00     Years: 25.00     Pack years: 50.00    Smokeless tobacco: Never Used   Substance Use Topics    Alcohol use: Yes     Comment: Juan Edgar        ROS  As stated in HPI, otherwise all others negative. Objective: There were no vitals taken for this visit. General: alert, cooperative, no distress   Mental  status: normal mood, behavior, speech, dress, motor activity, and thought processes, able to follow commands   HENT: NCAT   Neck: no visualized mass   Resp: no respiratory distress   Neuro: no gross deficits   Skin: no discoloration or lesions of concern on visible areas   Psychiatric: normal affect, consistent with stated mood, no evidence of hallucinations     Additional exam findings: We discussed the expected course, resolution and complications of the diagnosis(es) in detail. Medication risks, benefits, costs, interactions, and alternatives were discussed as indicated. I advised him to contact the office if his condition worsens, changes or fails to improve as anticipated. He expressed understanding with the diagnosis(es) and plan. Claudia Allen is a 78 y.o. male who was evaluated by a video visit encounter for concerns as above. Patient identification was verified prior to start of the visit. A caregiver was present when appropriate. Due to this being a TeleHealth encounter (During IXE69 public health emergency), evaluation of the following organ systems was limited: Vitals/Constitutional/EENT/Resp/CV/GI//MS/Neuro/Skin/Heme-Lymph-Imm.   Pursuant to the emergency declaration under the [employer] Act and the [address] waiver authority and the [employer] Act, this Virtual  Visit was conducted, with patient's (and/or legal guardian's) consent, to reduce the patient's risk of exposure to COVID-19 and provide necessary medical care. Services were provided through a video synchronous discussion virtually to substitute for in-person clinic visit. Patient and provider were located at their individual homes. An After Visit Summary was printed and given to the patient. All diagnosis have been discussed with the patient and all of the patient's questions have been answered. Follow-up and Dispositions    · Return for ASAP, Labs, AND, 3 months, HTN, HLD, 30 min, office only. Akilah Melvin, [facility] [address] [address]   [address]

## 2020-09-09 NOTE — PROGRESS NOTES
Chief Complaint   Patient presents with    Hypertension    Cholesterol Problem     1. Have you been to the ER, urgent care clinic since your last visit? Hospitalized since your last visit? No    2. Have you seen or consulted any other health care providers outside of the [address] [place] [address] since your last visit? Include any pap smears or colon screening.  No

## 2020-10-23 ENCOUNTER — TELEPHONE (OUTPATIENT)
Dept: FAMILY MEDICINE CLINIC | Age: 70
End: 2020-10-23

## 2020-10-23 DIAGNOSIS — R19.5 POSITIVE COLORECTAL CANCER SCREENING USING COLOGUARD TEST: Primary | ICD-10-CM

## 2020-10-23 NOTE — TELEPHONE ENCOUNTER
Spoke to [employer] and informed him his cologuard was positive. Will refer to GI for further evaluation. He is in agreement with this plan of care. All diagnosis have been discussed with the patient and all of the patient's questions have been answered.

## 2020-10-23 NOTE — TELEPHONE ENCOUNTER
Called mr Saman Sangita to inform him of his positive cologuard. No answer, [facility] to call me back.

## 2021-02-08 DIAGNOSIS — K21.9 GASTROESOPHAGEAL REFLUX DISEASE: ICD-10-CM

## 2021-02-08 DIAGNOSIS — I10 ESSENTIAL HYPERTENSION: ICD-10-CM

## 2021-02-08 RX ORDER — OMEPRAZOLE 40 MG/1
40 CAPSULE, DELAYED RELEASE ORAL DAILY
Qty: 90 CAP | Refills: 1 | Status: SHIPPED | OUTPATIENT
Start: 2021-02-08 | End: 2021-03-08 | Stop reason: SDUPTHER

## 2021-02-08 RX ORDER — LISINOPRIL 40 MG/1
40 TABLET ORAL DAILY
Qty: 90 TAB | Refills: 1 | Status: SHIPPED | OUTPATIENT
Start: 2021-02-08 | End: 2021-08-16

## 2021-03-08 ENCOUNTER — VIRTUAL VISIT (OUTPATIENT)
Dept: FAMILY MEDICINE CLINIC | Age: 71
End: 2021-03-08
Payer: MEDICARE

## 2021-03-08 DIAGNOSIS — K21.9 GASTROESOPHAGEAL REFLUX DISEASE, UNSPECIFIED WHETHER ESOPHAGITIS PRESENT: ICD-10-CM

## 2021-03-08 DIAGNOSIS — I10 ESSENTIAL HYPERTENSION: ICD-10-CM

## 2021-03-08 PROCEDURE — 99213 OFFICE O/P EST LOW 20 MIN: CPT | Performed by: NURSE PRACTITIONER

## 2021-03-08 PROCEDURE — G0463 HOSPITAL OUTPT CLINIC VISIT: HCPCS | Performed by: NURSE PRACTITIONER

## 2021-03-08 RX ORDER — HYDROCHLOROTHIAZIDE 25 MG/1
25 TABLET ORAL DAILY
Qty: 90 TAB | Refills: 1 | Status: SHIPPED | OUTPATIENT
Start: 2021-03-08 | End: 2022-01-09

## 2021-03-08 RX ORDER — LISINOPRIL 40 MG/1
40 TABLET ORAL DAILY
Qty: 90 TAB | Refills: 1 | Status: CANCELLED | OUTPATIENT
Start: 2021-03-08

## 2021-03-08 RX ORDER — OMEPRAZOLE 40 MG/1
40 CAPSULE, DELAYED RELEASE ORAL DAILY
Qty: 90 CAP | Refills: 1 | Status: SHIPPED | OUTPATIENT
Start: 2021-03-08 | End: 2022-02-17 | Stop reason: SDUPTHER

## 2021-03-08 NOTE — PROGRESS NOTES
[address] [place] [address]               [phone]      Rosamaria Mc is a 69 y.o. male who was seen by synchronous (real-time) audio-video technology on 3/8/2021. Consent: Rosamaria Mc, who was seen by synchronous (real-time) audio-video technology, and/or his healthcare decision maker, is aware that this patient-initiated, Telehealth encounter on 3/8/2021 is a billable service, with coverage as determined by his insurance carrier. He is aware that he may receive a bill and has provided verbal consent to proceed: Yes. Assessment & Plan:   Diagnoses and all orders for this visit:    1. Essential hypertension  -     hydroCHLOROthiazide (HYDRODIURIL) 25 mg tablet; Take 1 Tab by mouth daily. 2. Gastroesophageal reflux disease, unspecified whether esophagitis present  -     omeprazole (PRILOSEC) 40 mg capsule; Take 1 Cap by mouth daily. Endorses medication compliance  Has checked his blood pressure outside the office, does not remember the readings however he says they have been \"good\", continue same medications for now  Have asked him to come in the office to get the labs performed that were ordered in September and to get his blood pressure checked with the nurse  Next visit will be in office in addition to performing his annual Medicare wellness exam    Follow-up and Dispositions    · Return for ASAP, Labs, nurse visit, b/p check, AND, 3 months, MAWV, HTN, 30 min, office only. 712  Subjective:   Rosamaria Mc is a 69 y.o. male who was seen for   Hypertension     Last visit was on 9/9/2020, he did not get requested labs one nor did he come in for a b/p check    Hypertension:   Patient reports taking medications as instructed. yes   Medication side effects noted. no  Headache upon wakening. no   Home BP monitoring in range of does check, but does not remember the readings    Do you experience chest pain/pressure or SOB with exertion? no  Maintain a low salt diet? yes  Key CAD CHF Meds             hydroCHLOROthiazide (HYDRODIURIL) 25 mg tablet (Taking) Take 1 Tab by mouth daily. lisinopriL (PRINIVIL, ZESTRIL) 40 mg tablet (Taking) Take 1 Tab by mouth daily. Prior to Admission medications    Medication Sig Start Date End Date Taking? Authorizing Provider   hydroCHLOROthiazide (HYDRODIURIL) 25 mg tablet Take 1 Tab by mouth daily. 3/8/21  Yes Vivienne Soto NP   omeprazole (PRILOSEC) 40 mg capsule Take 1 Cap by mouth daily. 3/8/21  Yes Vivienne Soto NP   lisinopriL (PRINIVIL, ZESTRIL) 40 mg tablet Take 1 Tab by mouth daily. 2/8/21  Yes Vivienne Soto NP   omeprazole (PRILOSEC) 40 mg capsule Take 1 Cap by mouth daily. 2/8/21 3/8/21  Vivienne Soto NP   hydroCHLOROthiazide (HYDRODIURIL) 25 mg tablet Take 1 Tab by mouth daily. 6/9/20 3/8/21  Vivienne Soto NP     No Known Allergies    Patient Active Problem List   Diagnosis Code    Hypertension I10    GERD (gastroesophageal reflux disease) K21.9    History of tobacco use Z87.891     Past Surgical History:   Procedure Laterality Date    HX ORTHOPAEDIC      knee rt 2018     Family History   Problem Relation Age of Onset    No Known Problems Mother     No Known Problems Father      Social History     Tobacco Use    Smoking status: Former Smoker     Packs/day: 2.00     Years: 25.00     Pack years: 50.00    Smokeless tobacco: Never Used   Substance Use Topics    Alcohol use: Yes     Comment: Juan TRUJILLO  As stated in HPI, otherwise all others negative. Objective: There were no vitals taken for this visit.    General: alert, cooperative, no distress   Mental  status: normal mood, behavior, speech, dress, motor activity, and thought processes, able to follow commands   HENT: NCAT   Neck: no visualized mass   Resp: no respiratory distress   Neuro: no gross deficits   Skin: no discoloration or lesions of concern on visible areas Psychiatric: normal affect, consistent with stated mood, no evidence of hallucinations     Additional exam findings: We discussed the expected course, resolution and complications of the diagnosis(es) in detail. Medication risks, benefits, costs, interactions, and alternatives were discussed as indicated. I advised him to contact the office if his condition worsens, changes or fails to improve as anticipated. He expressed understanding with the diagnosis(es) and plan. Virgilio Sparks is a 69 y.o. male who was evaluated by a video visit encounter for concerns as above. Patient identification was verified prior to start of the visit. A caregiver was present when appropriate. Due to this being a TeleHealth encounter (During [facility] emergency), evaluation of the following organ systems was limited: Vitals/Constitutional/EENT/Resp/CV/GI//MS/Neuro/Skin/Heme-Lymph-Imm. Pursuant to the emergency declaration under the [facility] [place] [address] waiver authority and the [employer] Act, this Virtual  Visit was conducted, with patient's (and/or legal guardian's) consent, to reduce the patient's risk of exposure to COVID-19 and provide necessary medical care. Services were provided through a video synchronous discussion virtually to substitute for in-person clinic visit. Patient and provider were located at their individual homes. An After Visit Summary was printed and given to the patient. All diagnosis have been discussed with the patient and all of the patient's questions have been answered. Follow-up and Dispositions    · Return for ASAP, Labs, nurse visit, b/p check, AND, 3 months, MAWV, HTN, 30 min, office only. Colletta Landsberg, [facility] [id number] [address]   [place]icio

## 2021-03-08 NOTE — PROGRESS NOTES
Chief Complaint   Patient presents with    Hypertension       1. Have you been to the ER, urgent care clinic since your last visit? Hospitalized since your last visit? No    2. Have you seen or consulted any other health care providers outside of the [address] [place] [address] since your last visit? Include any pap smears or colon screening. Sarah Cole- Dr. Nano Fischer- Dec 2020- Polyps seen x 5 yrs     Chief Complaint   Patient presents with    Hypertension       3 most recent PHQ Screens 3/8/2021   Little interest or pleasure in doing things Not at all   Feeling down, depressed, irritable, or hopeless Not at all   Total Score PHQ 2 0     Fall Risk Assessment, last 12 mths 3/8/2021   Able to walk? Yes   Fall in past 12 months? 0   Do you feel unsteady? 0   Are you worried about falling 0               Learning Assessment 3/9/2020   PRIMARY LEARNER Patient   HIGHEST LEVEL OF EDUCATION - PRIMARY LEARNER  GRADUATED HIGH SCHOOL OR GED   BARRIERS PRIMARY LEARNER NONE   CO-LEARNER CAREGIVER No   PRIMARY LANGUAGE ENGLISH   LEARNER PREFERENCE PRIMARY LISTENING   ANSWERED BY Pamela CANDELARIA SELF     There were no vitals taken for this visit.

## 2021-03-11 ENCOUNTER — APPOINTMENT (OUTPATIENT)
Dept: FAMILY MEDICINE CLINIC | Age: 71
End: 2021-03-11

## 2021-03-11 ENCOUNTER — HOSPITAL ENCOUNTER (OUTPATIENT)
Dept: LAB | Age: 71
Discharge: HOME | End: 2021-03-11
Payer: MEDICARE

## 2021-03-11 ENCOUNTER — CLINICAL SUPPORT (OUTPATIENT)
Dept: FAMILY MEDICINE CLINIC | Age: 71
End: 2021-03-11

## 2021-03-11 VITALS
HEIGHT: 62 IN | DIASTOLIC BLOOD PRESSURE: 68 MMHG | WEIGHT: 193 LBS | HEART RATE: 61 BPM | OXYGEN SATURATION: 97 % | BODY MASS INDEX: 35.51 KG/M2 | TEMPERATURE: 97.1 F | SYSTOLIC BLOOD PRESSURE: 135 MMHG | RESPIRATION RATE: 12 BRPM

## 2021-03-11 DIAGNOSIS — I10 ESSENTIAL HYPERTENSION: ICD-10-CM

## 2021-03-11 DIAGNOSIS — E78.00 ELEVATED LDL CHOLESTEROL LEVEL: ICD-10-CM

## 2021-03-11 DIAGNOSIS — I10 ESSENTIAL HYPERTENSION: Primary | ICD-10-CM

## 2021-03-11 DIAGNOSIS — Z11.59 NEED FOR HEPATITIS C SCREENING TEST: ICD-10-CM

## 2021-03-11 LAB
ALBUMIN SERPL-MCNC: 3.6 G/DL (ref 3.4–5)
ALBUMIN/GLOB SERPL: 1.1 {RATIO} (ref 0.8–1.7)
ALP SERPL-CCNC: 69 U/L (ref 45–117)
ALT SERPL-CCNC: 53 U/L (ref 16–61)
ANION GAP SERPL CALC-SCNC: 4 MMOL/L (ref 3–18)
AST SERPL-CCNC: 29 U/L (ref 10–38)
BILIRUB SERPL-MCNC: 0.6 MG/DL (ref 0.2–1)
BUN SERPL-MCNC: 23 MG/DL (ref 7–18)
BUN/CREAT SERPL: 17 (ref 12–20)
CALCIUM SERPL-MCNC: 8.8 MG/DL (ref 8.5–10.1)
CHLORIDE SERPL-SCNC: 106 MMOL/L (ref 100–111)
CHOLEST SERPL-MCNC: 173 MG/DL
CO2 SERPL-SCNC: 30 MMOL/L (ref 21–32)
CREAT SERPL-MCNC: 1.34 MG/DL (ref 0.6–1.3)
GLOBULIN SER CALC-MCNC: 3.4 G/DL (ref 2–4)
GLUCOSE SERPL-MCNC: 94 MG/DL (ref 74–99)
HDLC SERPL-MCNC: 37 MG/DL (ref 40–60)
HDLC SERPL: 4.7 {RATIO} (ref 0–5)
LDLC SERPL CALC-MCNC: 106.2 MG/DL (ref 0–100)
LIPID PROFILE,FLP: ABNORMAL
POTASSIUM SERPL-SCNC: 3.9 MMOL/L (ref 3.5–5.5)
PROT SERPL-MCNC: 7 G/DL (ref 6.4–8.2)
SODIUM SERPL-SCNC: 140 MMOL/L (ref 136–145)
TRIGL SERPL-MCNC: 149 MG/DL (ref ?–150)
VLDLC SERPL CALC-MCNC: 29.8 MG/DL

## 2021-03-11 PROCEDURE — 80061 LIPID PANEL: CPT

## 2021-03-11 PROCEDURE — 36415 COLL VENOUS BLD VENIPUNCTURE: CPT

## 2021-03-11 PROCEDURE — 80053 COMPREHEN METABOLIC PANEL: CPT

## 2021-03-11 PROCEDURE — 86803 HEPATITIS C AB TEST: CPT

## 2021-03-11 NOTE — PROGRESS NOTES
Chief Complaint   Patient presents with    Blood Pressure Check     Has appt 6/8/21    Pt took medications this morning      If NP Ja Frank wants to make any changes, will call pt. Chief Complaint   Patient presents with    Blood Pressure Check       3 most recent PHQ Screens 3/11/2021   Little interest or pleasure in doing things Not at all   Feeling down, depressed, irritable, or hopeless Not at all   Total Score PHQ 2 0     Fall Risk Assessment, last 12 mths 3/11/2021   Able to walk? Yes   Fall in past 12 months? 0   Do you feel unsteady?  0   Are you worried about falling 0               Learning Assessment 3/9/2020   PRIMARY LEARNER Patient   HIGHEST LEVEL OF EDUCATION - PRIMARY LEARNER  GRADUATED HIGH SCHOOL OR GED   BARRIERS PRIMARY LEARNER NONE   CO-LEARNER CAREGIVER No   PRIMARY LANGUAGE ENGLISH   LEARNER PREFERENCE PRIMARY LISTENING   ANSWERED BY Clearance Grain   RELATIONSHIP SELF     Visit Vitals  /68   Pulse 61   Temp 97.1 °F (36.2 °C) (Oral)   Resp 12   Ht 5' 2\" (1.575 m)   Wt 193 lb (87.5 kg)   SpO2 97%   BMI 35.30 kg/m²

## 2021-03-12 LAB
HCV AB SER IA-ACNC: 0.07 INDEX
HCV AB SERPL QL IA: NEGATIVE
HCV COMMENT,HCGAC: NORMAL

## 2021-06-08 ENCOUNTER — OFFICE VISIT (OUTPATIENT)
Dept: FAMILY MEDICINE CLINIC | Age: 71
End: 2021-06-08
Payer: MEDICARE

## 2021-06-08 VITALS
OXYGEN SATURATION: 99 % | BODY MASS INDEX: 35.74 KG/M2 | SYSTOLIC BLOOD PRESSURE: 136 MMHG | WEIGHT: 194.2 LBS | HEIGHT: 62 IN | HEART RATE: 56 BPM | DIASTOLIC BLOOD PRESSURE: 80 MMHG | TEMPERATURE: 98.1 F

## 2021-06-08 DIAGNOSIS — Z12.5 SPECIAL SCREENING FOR MALIGNANT NEOPLASM OF PROSTATE: ICD-10-CM

## 2021-06-08 DIAGNOSIS — Z00.00 MEDICARE ANNUAL WELLNESS VISIT, SUBSEQUENT: Primary | ICD-10-CM

## 2021-06-08 DIAGNOSIS — I10 ESSENTIAL HYPERTENSION: ICD-10-CM

## 2021-06-08 DIAGNOSIS — Z87.891 PERSONAL HISTORY OF TOBACCO USE, PRESENTING HAZARDS TO HEALTH: ICD-10-CM

## 2021-06-08 DIAGNOSIS — Z13.6 ENCOUNTER FOR SCREENING FOR CARDIOVASCULAR DISORDERS: ICD-10-CM

## 2021-06-08 DIAGNOSIS — Z23 ENCOUNTER FOR IMMUNIZATION: ICD-10-CM

## 2021-06-08 PROCEDURE — G8432 DEP SCR NOT DOC, RNG: HCPCS | Performed by: NURSE PRACTITIONER

## 2021-06-08 PROCEDURE — G8417 CALC BMI ABV UP PARAM F/U: HCPCS | Performed by: NURSE PRACTITIONER

## 2021-06-08 PROCEDURE — G8536 NO DOC ELDER MAL SCRN: HCPCS | Performed by: NURSE PRACTITIONER

## 2021-06-08 PROCEDURE — G8754 DIAS BP LESS 90: HCPCS | Performed by: NURSE PRACTITIONER

## 2021-06-08 PROCEDURE — 90732 PPSV23 VACC 2 YRS+ SUBQ/IM: CPT | Performed by: NURSE PRACTITIONER

## 2021-06-08 PROCEDURE — G0439 PPPS, SUBSEQ VISIT: HCPCS | Performed by: NURSE PRACTITIONER

## 2021-06-08 PROCEDURE — 3017F COLORECTAL CA SCREEN DOC REV: CPT | Performed by: NURSE PRACTITIONER

## 2021-06-08 PROCEDURE — 1101F PT FALLS ASSESS-DOCD LE1/YR: CPT | Performed by: NURSE PRACTITIONER

## 2021-06-08 PROCEDURE — G0009 ADMIN PNEUMOCOCCAL VACCINE: HCPCS | Performed by: NURSE PRACTITIONER

## 2021-06-08 PROCEDURE — G8752 SYS BP LESS 140: HCPCS | Performed by: NURSE PRACTITIONER

## 2021-06-08 PROCEDURE — G8427 DOCREV CUR MEDS BY ELIG CLIN: HCPCS | Performed by: NURSE PRACTITIONER

## 2021-06-08 NOTE — PROGRESS NOTES
Patient states medication is going up in cost meds are lisinopril and hydroclothazide there could be others     1. Have you been to the ER, urgent care clinic since your last visit? Hospitalized since your last visit? No    2. Have you seen or consulted any other health care providers outside of the [address] [place] [address] since your last visit? Include any pap smears or colon screening.  No     Health Maintenance Due   Topic Date Due    Shingrix Vaccine Age 49> (1 of 2) Never done    AAA Screening 73-67 YO Male Smoking Patients  Never done    Pneumococcal 65+ years (2 of 2 - PPSV23) 04/07/2020    Medicare Yearly Exam  06/10/2021

## 2021-06-08 NOTE — PROGRESS NOTES
[address] [place] [address]               [phone]      Madison Samuel is a 69 y.o. male and presents with Hypertension and Annual Wellness Visit       Assessment/Plan:    Diagnoses and all orders for this visit:    1. Medicare annual wellness visit, subsequent  completed today to include ETOH and depression screening, health care decision makers updated   2. Essential hypertension  -     METABOLIC PANEL, COMPREHENSIVE; Future  Endorses medication compliance, Follow-up labs today and Blood pressure stable, continue same medications   3. Special screening for malignant neoplasm of prostate  -     PSA SCREENING (SCREENING); Future  Health maintenance needs addressed   4. Personal history of tobacco use, presenting hazards to health  -     [address] AAA; Future  Health maintenance needs addressed  5. Encounter for immunization  -     PNEUMOCOCCAL POLYSACCHARIDE VACCINE, 23-VALENT, ADULT OR IMMUNOSUPPRESSED PT DOSE,  -     ADMIN PNEUMOCOCCAL VACCINE  Health maintenance needs addressed  6. Encounter for screening for cardiovascular disorders   -     US EXAM SCREENING AAA; Future  Health maintenance needs addressed      Follow-up and Dispositions    · Return in about 4 months (around 10/8/2021) for HTN, 15 min, office only, AND, labs prior. Subjective:    Hypertension:   Patient reports taking medications as instructed. yes   Medication side effects noted. no  Headache upon wakening. no   Home BP monitoring in range of does not rememer's systolic. Do you experience chest pain/pressure or SOB with exertion? no  Maintain a low salt diet? yes  Key CAD CHF Meds             hydroCHLOROthiazide (HYDRODIURIL) 25 mg tablet (Taking) Take 1 Tab by mouth daily. lisinopriL (PRINIVIL, ZESTRIL) 40 mg tablet (Taking) Take 1 Tab by mouth daily. ROS:     ROS  As stated in HPI, otherwise all others negative.      The problem list was updated as a part of today's visit. Patient Active Problem List   Diagnosis Code    Hypertension I10    GERD (gastroesophageal reflux disease) K21.9    Personal history of tobacco use, presenting hazards to health Z87.051       The PSH, FH were reviewed. SH:  Social History     Tobacco Use    Smoking status: Former Smoker     Packs/day: 2.00     Years: 25.00     Pack years: 50.00    Smokeless tobacco: Never Used   Vaping Use    Vaping Use: Never used   Substance Use Topics    Alcohol use: Yes     Comment: Juan Reza Tala     Drug use: Not Currently       Medications/Allergies:  Current Outpatient Medications on File Prior to Visit   Medication Sig Dispense Refill    hydroCHLOROthiazide (HYDRODIURIL) 25 mg tablet Take 1 Tab by mouth daily. 90 Tab 1    omeprazole (PRILOSEC) 40 mg capsule Take 1 Cap by mouth daily. 90 Cap 1    lisinopriL (PRINIVIL, ZESTRIL) 40 mg tablet Take 1 Tab by mouth daily. 90 Tab 1     No current facility-administered medications on file prior to visit. No Known Allergies    Objective:  Visit Vitals  /80   Pulse (!) 56   Temp 98.1 °F (36.7 °C) (Temporal)   Ht 5' 2\" (1.575 m)   Wt 194 lb 3.2 oz (88.1 kg)   SpO2 99%   BMI 35.52 kg/m²    Body mass index is 35.52 kg/m². Physical assessment  Physical Exam  Vitals and nursing note reviewed. Eyes:      Conjunctiva/sclera: Conjunctivae normal.      Pupils: Pupils are equal, round, and reactive to light. Neck:      Vascular: No JVD. Cardiovascular:      Rate and Rhythm: Normal rate and regular rhythm. Heart sounds: Normal heart sounds. No murmur heard. No friction rub. No gallop. Pulmonary:      Effort: Pulmonary effort is normal.      Breath sounds: Normal breath sounds. Musculoskeletal:         General: Normal range of motion. Cervical back: Normal range of motion. Skin:     General: Skin is warm and dry. Neurological:      Mental Status: He is alert and oriented to person, place, and time.    Psychiatric:         Mood and Affect: Affect normal.         Cognition and Memory: Memory normal.         Judgment: Judgment normal.           Labwork and Ancillary Studies:    CBC w/Diff  No results found for: WBC, WBCLT, HGB, HGBP, PLT, HGBEXT, PLTEXT, HGBEXT, PLTEXT      Basic Metabolic Profile  Lab Results   Component Value Date/Time    Sodium 140 03/11/2021 09:48 AM    Potassium 3.9 03/11/2021 09:48 AM    Chloride 106 03/11/2021 09:48 AM    CO2 30 03/11/2021 09:48 AM    Anion gap 4 03/11/2021 09:48 AM    Glucose 94 03/11/2021 09:48 AM    BUN 23 (H) 03/11/2021 09:48 AM    Creatinine 1.34 (H) 03/11/2021 09:48 AM    BUN/Creatinine ratio 17 03/11/2021 09:48 AM    GFR est AA >60 03/11/2021 09:48 AM    GFR est non-AA 53 (L) 03/11/2021 09:48 AM    Calcium 8.8 03/11/2021 09:48 AM        Cholesterol  Lab Results   Component Value Date/Time    Cholesterol, total 173 03/11/2021 09:48 AM    HDL Cholesterol 37 (L) 03/11/2021 09:48 AM    LDL, calculated 106.2 (H) 03/11/2021 09:48 AM    Triglyceride 149 03/11/2021 09:48 AM    CHOL/HDL Ratio 4.7 03/11/2021 09:48 AM       Health Maintenance:   Health Maintenance   Topic Date Due    Shingrix Vaccine Age 50> (1 of 2) Never done    AAA Screening 73-69 YO Male Smoking Patients  Never done    Flu Vaccine (Season Ended) 09/01/2021    Medicare Yearly Exam  06/09/2022    Lipid Screen  03/11/2026    DTaP/Tdap/Td series (2 - Td or Tdap) 04/07/2029    Colorectal Cancer Screening Combo  12/21/2030    Hepatitis C Screening  Completed    COVID-19 Vaccine  Completed    Pneumococcal 65+ years  Completed       I have discussed the diagnosis with the patient and the intended plan as seen in the above orders. The patient has received an After-Visit Summary and questions were answered concerning future plans. An After Visit Summary was printed and given to the patient. All diagnosis have been discussed with the patient and all of the patient's questions have been answered.      Follow-up and Dispositions · Return in about 4 months (around 10/8/2021) for HTN, 15 min, office only, AND, labs prior. Yan Guo, [facility]  [address] [facility]   [address] [facility] [address] [address]  This is the Subsequent Medicare Annual Wellness Exam, performed 12 months or more after the Initial AWV or the last Subsequent AWV    I have reviewed the patient's medical history in detail and updated the computerized patient record. Assessment/Plan   Education and counseling provided:  Are appropriate based on today's review and evaluation  End-of-Life planning (with patient's consent)  Pneumococcal Vaccine  Prostate cancer screening tests (PSA, covered annually)    1. Medicare annual wellness visit, subsequent  2. Essential hypertension  -     METABOLIC PANEL, COMPREHENSIVE; Future  3. Special screening for malignant neoplasm of prostate  -     PSA SCREENING (SCREENING); Future  4. Personal history of tobacco use, presenting hazards to health  -     Mario [address] AAA; Future  5. Encounter for immunization  -     PNEUMOCOCCAL POLYSACCHARIDE VACCINE, 23-VALENT, ADULT OR IMMUNOSUPPRESSED PT DOSE,  -     ADMIN PNEUMOCOCCAL VACCINE  6. Encounter for screening for cardiovascular disorders   -     US EXAM SCREENING AAA; Future       Depression Risk Factor Screening     3 most recent PHQ Screens 6/8/2021   Little interest or pleasure in doing things Not at all   Feeling down, depressed, irritable, or hopeless Not at all   Total Score PHQ 2 0       Alcohol Risk Screen    Do you average more than 1 drink per night or more than 7 drinks a week: No    In the past three months have you have had more than 4 drinks containing alcohol on one occasion: No        Functional Ability and Level of Safety    Hearing: Hearing is good. has worn hearing aids in the past      Activities of Daily Living:   The home contains: handrails  Patient does total self care      Ambulation: with no difficulty     Fall Risk:  Fall Risk Assessment, last 12 mths 3/11/2021   Able to walk? Yes   Fall in past 12 months? 0   Do you feel unsteady? 0   Are you worried about falling 0      Abuse Screen:  Patient is not abused       Cognitive Screening    Has your family/caregiver stated any concerns about your memory: no     Cognitive Screening: Normal - Clock Drawing Test    Health Maintenance Due     Health Maintenance Due   Topic Date Due    Shingrix Vaccine Age 49> (1 of 2) Never done    AAA Screening 73-67 YO Male Smoking Patients  Never done       Patient Care Team   Patient Care Team:  Nolia Crigler, NP as PCP - General (Nurse Practitioner)  Nolia Crigler, NP as PCP - Hussein Garcia Provider    History     Patient Active Problem List   Diagnosis Code    Hypertension I10    GERD (gastroesophageal reflux disease) K21.9    Personal history of tobacco use, presenting hazards to health Z87.891     Past Medical History:   Diagnosis Date    GERD (gastroesophageal reflux disease)     Hypertension       Past Surgical History:   Procedure Laterality Date    HX ORTHOPAEDIC      knee rt 2018     Current Outpatient Medications   Medication Sig Dispense Refill    hydroCHLOROthiazide (HYDRODIURIL) 25 mg tablet Take 1 Tab by mouth daily. 90 Tab 1    omeprazole (PRILOSEC) 40 mg capsule Take 1 Cap by mouth daily. 90 Cap 1    lisinopriL (PRINIVIL, ZESTRIL) 40 mg tablet Take 1 Tab by mouth daily.  90 Tab 1     No Known Allergies    Family History   Problem Relation Age of Onset    No Known Problems Mother     No Known Problems Father      Social History     Tobacco Use    Smoking status: Former Smoker     Packs/day: 2.00     Years: 25.00     Pack years: 50.00    Smokeless tobacco: Never Used   Substance Use Topics    Alcohol use: Yes     Comment: Jefferson Mo NP

## 2021-06-08 NOTE — PATIENT INSTRUCTIONS
Medicare Wellness Visit, Male    The best way to live healthy is to have a lifestyle where you eat a well-balanced diet, exercise regularly, limit alcohol use, and quit all forms of tobacco/nicotine, if applicable. Regular preventive services are another way to keep healthy. Preventive services (vaccines, screening tests, monitoring & exams) can help personalize your care plan, which helps you manage your own care. Screening tests can find health problems at the earliest stages, when they are easiest to treat. [facility] follows the current, evidence-based guidelines published by the [facility] ([facility]) when recommending preventive services for our patients. Because we follow these guidelines, sometimes recommendations change over time as research supports it. (For example, a prostate screening blood test is no longer routinely recommended for men with no symptoms). Of course, you and your doctor may decide to screen more often for some diseases, based on your risk and co-morbidities (chronic disease you are already diagnosed with). Preventive services for you include:  - Medicare offers their members a free annual wellness visit, which is time for you and your primary care provider to discuss and plan for your preventive service needs. Take advantage of this benefit every year!  -All adults over age 72 should receive the recommended pneumonia vaccines. Current USPSTF guidelines recommend a series of two vaccines for the best pneumonia protection.   -All adults should have a flu vaccine yearly and tetanus vaccine every 10 years.  -All adults age 48 and older should receive the shingles vaccines (series of two vaccines).        -All adults age 38-68 who are overweight should have a diabetes screening test once every three years.   -Other screening tests & preventive services for persons with diabetes include: an eye exam to screen for diabetic retinopathy, a kidney function test, a foot exam, and stricter control over your cholesterol.   -Cardiovascular screening for adults with routine risk involves an electrocardiogram (ECG) at intervals determined by the provider.   -Colorectal cancer screening should be done for adults age 54-65 with no increased risk factors for colorectal cancer. There are a number of acceptable methods of screening for this type of cancer. Each test has its own benefits and drawbacks. Discuss with your provider what is most appropriate for you during your annual wellness visit. The different tests include: colonoscopy (considered the best screening method), a fecal occult blood test, a fecal DNA test, and sigmoidoscopy.  -All adults born between [facility] should be screened once for Hepatitis C.  -An Abdominal Aortic Aneurysm (AAA) Screening is recommended for men age 73-68 who has ever smoked in their lifetime.      Here is a list of your current Health Maintenance items (your personalized list of preventive services) with a due date:  Health Maintenance Due   Topic Date Due    Shingles Vaccine (1 of 2) Never done    AAA Screening  Never done    Pneumococcal Vaccine (2 of 2 - PPSV23) 04/07/2020

## 2021-08-14 DIAGNOSIS — I10 ESSENTIAL HYPERTENSION: ICD-10-CM

## 2021-08-16 RX ORDER — LISINOPRIL 40 MG/1
TABLET ORAL
Qty: 90 TABLET | Refills: 1 | Status: SHIPPED | OUTPATIENT
Start: 2021-08-16 | End: 2022-02-17 | Stop reason: SDUPTHER

## 2021-09-29 ENCOUNTER — HOSPITAL ENCOUNTER (OUTPATIENT)
Dept: LAB | Age: 71
Discharge: HOME | End: 2021-09-29
Payer: MEDICARE

## 2021-09-29 DIAGNOSIS — Z12.5 SPECIAL SCREENING FOR MALIGNANT NEOPLASM OF PROSTATE: ICD-10-CM

## 2021-09-29 DIAGNOSIS — I10 ESSENTIAL HYPERTENSION: ICD-10-CM

## 2021-09-29 LAB
ALBUMIN SERPL-MCNC: 3.4 G/DL (ref 3.4–5)
ALBUMIN/GLOB SERPL: 1 {RATIO} (ref 0.8–1.7)
ALP SERPL-CCNC: 91 U/L (ref 45–117)
ALT SERPL-CCNC: 52 U/L (ref 16–61)
ANION GAP SERPL CALC-SCNC: 5 MMOL/L (ref 3–18)
AST SERPL-CCNC: 27 U/L (ref 10–38)
BILIRUB SERPL-MCNC: 0.4 MG/DL (ref 0.2–1)
BUN SERPL-MCNC: 13 MG/DL (ref 7–18)
BUN/CREAT SERPL: 10 (ref 12–20)
CALCIUM SERPL-MCNC: 8.9 MG/DL (ref 8.5–10.1)
CHLORIDE SERPL-SCNC: 104 MMOL/L (ref 100–111)
CO2 SERPL-SCNC: 30 MMOL/L (ref 21–32)
CREAT SERPL-MCNC: 1.3 MG/DL (ref 0.6–1.3)
GLOBULIN SER CALC-MCNC: 3.3 G/DL (ref 2–4)
GLUCOSE SERPL-MCNC: 126 MG/DL (ref 74–99)
POTASSIUM SERPL-SCNC: 4.2 MMOL/L (ref 3.5–5.5)
PROT SERPL-MCNC: 6.7 G/DL (ref 6.4–8.2)
PSA SERPL-MCNC: 1.6 NG/ML (ref 0–4)
SODIUM SERPL-SCNC: 139 MMOL/L (ref 136–145)

## 2021-09-29 PROCEDURE — 84153 ASSAY OF PSA TOTAL: CPT

## 2021-09-29 PROCEDURE — 80053 COMPREHEN METABOLIC PANEL: CPT

## 2021-09-29 PROCEDURE — 36415 COLL VENOUS BLD VENIPUNCTURE: CPT

## 2021-10-20 ENCOUNTER — OFFICE VISIT (OUTPATIENT)
Dept: FAMILY MEDICINE CLINIC | Age: 71
End: 2021-10-20
Payer: MEDICARE

## 2021-10-20 VITALS
SYSTOLIC BLOOD PRESSURE: 140 MMHG | OXYGEN SATURATION: 96 % | HEIGHT: 62 IN | WEIGHT: 190 LBS | DIASTOLIC BLOOD PRESSURE: 73 MMHG | TEMPERATURE: 98.6 F | HEART RATE: 64 BPM | BODY MASS INDEX: 34.96 KG/M2 | RESPIRATION RATE: 20 BRPM

## 2021-10-20 DIAGNOSIS — I10 PRIMARY HYPERTENSION: Primary | ICD-10-CM

## 2021-10-20 DIAGNOSIS — N18.31 STAGE 3A CHRONIC KIDNEY DISEASE (HCC): ICD-10-CM

## 2021-10-20 PROCEDURE — G8536 NO DOC ELDER MAL SCRN: HCPCS | Performed by: NURSE PRACTITIONER

## 2021-10-20 PROCEDURE — 3017F COLORECTAL CA SCREEN DOC REV: CPT | Performed by: NURSE PRACTITIONER

## 2021-10-20 PROCEDURE — G8432 DEP SCR NOT DOC, RNG: HCPCS | Performed by: NURSE PRACTITIONER

## 2021-10-20 PROCEDURE — 1101F PT FALLS ASSESS-DOCD LE1/YR: CPT | Performed by: NURSE PRACTITIONER

## 2021-10-20 PROCEDURE — G8417 CALC BMI ABV UP PARAM F/U: HCPCS | Performed by: NURSE PRACTITIONER

## 2021-10-20 PROCEDURE — G8753 SYS BP > OR = 140: HCPCS | Performed by: NURSE PRACTITIONER

## 2021-10-20 PROCEDURE — G8427 DOCREV CUR MEDS BY ELIG CLIN: HCPCS | Performed by: NURSE PRACTITIONER

## 2021-10-20 PROCEDURE — G8754 DIAS BP LESS 90: HCPCS | Performed by: NURSE PRACTITIONER

## 2021-10-20 PROCEDURE — 99213 OFFICE O/P EST LOW 20 MIN: CPT | Performed by: NURSE PRACTITIONER

## 2021-10-20 NOTE — PROGRESS NOTES
Room 7    Did patient bring someone? No    Did the patient have DME equipment? No    Did you take your medication today? Yes      1. Have you been to the ER, urgent care clinic since your last visit? Hospitalized since your last visit? No    2. Have you seen or consulted any other health care providers outside of the [address] [place] [address] since your last visit? Include any pap smears or colon screening.  No      Health Maintenance Due   Topic Date Due    Shingrix Vaccine Age 49> (1 of 2) Never done    AAA Screening 73-67 YO Male Smoking Patients  Never done    Flu Vaccine (1) Never done    COVID-19 Vaccine (3 - Pfizer booster) 10/13/2021

## 2021-10-20 NOTE — PATIENT INSTRUCTIONS
DASH Diet: Care Instructions  Your Care Instructions     The DASH diet is an eating plan that can help lower your blood pressure. DASH stands for Dietary Approaches to Stop Hypertension. Hypertension is high blood pressure. The DASH diet focuses on eating foods that are high in calcium, potassium, and magnesium. These nutrients can lower blood pressure. The foods that are highest in these nutrients are fruits, vegetables, low-fat dairy products, nuts, seeds, and legumes. But taking calcium, potassium, and magnesium supplements instead of eating foods that are high in those nutrients does not have the same effect. The DASH diet also includes whole grains, fish, and poultry. The DASH diet is one of several lifestyle changes your doctor may recommend to lower your high blood pressure. Your doctor may also want you to decrease the amount of sodium in your diet. Lowering sodium while following the DASH diet can lower blood pressure even further than just the DASH diet alone. Follow-up care is a key part of your treatment and safety. Be sure to make and go to all appointments, and call your doctor if you are having problems. It's also a good idea to know your test results and keep a list of the medicines you take. How can you care for yourself at home? Following the DASH diet  · Eat 4 to 5 servings of fruit each day. A serving is 1 medium-sized piece of fruit, ½ cup chopped or canned fruit, 1/4 cup dried fruit, or 4 ounces (½ cup) of fruit juice. Choose fruit more often than fruit juice. · Eat 4 to 5 servings of vegetables each day. A serving is 1 cup of lettuce or raw leafy vegetables, ½ cup of chopped or cooked vegetables, or 4 ounces (½ cup) of vegetable juice. Choose vegetables more often than vegetable juice. · Get 2 to 3 servings of low-fat and fat-free dairy each day. A serving is 8 ounces of milk, 1 cup of yogurt, or 1 ½ ounces of cheese. · Eat 6 to 8 servings of grains each day.  A serving is 1 slice of bread, 1 ounce of dry cereal, or ½ cup of cooked rice, pasta, or cooked cereal. Try to choose whole-grain products as much as possible. · Limit lean meat, poultry, and fish to 2 servings each day. A serving is 3 ounces, about the size of a deck of cards. · Eat 4 to 5 servings of nuts, seeds, and legumes (cooked dried beans, lentils, and split peas) each week. A serving is 1/3 cup of nuts, 2 tablespoons of seeds, or ½ cup of cooked beans or peas. · Limit fats and oils to 2 to 3 servings each day. A serving is 1 teaspoon of vegetable oil or 2 tablespoons of salad dressing. · Limit sweets and added sugars to 5 servings or less a week. A serving is 1 tablespoon jelly or jam, ½ cup sorbet, or 1 cup of lemonade. · Eat less than 2,300 milligrams (mg) of sodium a day. If you limit your sodium to 1,500 mg a day, you can lower your blood pressure even more. Tips for success  · Start small. Do not try to make dramatic changes to your diet all at once. You might feel that you are missing out on your favorite foods and then be more likely to not follow the plan. Make small changes, and stick with them. Once those changes become habit, add a few more changes. · Try some of the following:  ? Make it a goal to eat a fruit or vegetable at every meal and at snacks. This will make it easy to get the recommended amount of fruits and vegetables each day. ? Try yogurt topped with fruit and nuts for a snack or healthy dessert. ? Add lettuce, tomato, cucumber, and onion to sandwiches. ? Combine a ready-made pizza crust with low-fat mozzarella cheese and lots of vegetable toppings. Try using tomatoes, squash, spinach, broccoli, carrots, cauliflower, and onions. ? Have a variety of cut-up vegetables with a low-fat dip as an appetizer instead of chips and dip. ? Sprinkle sunflower seeds or chopped almonds over salads. Or try adding chopped walnuts or almonds to cooked vegetables.   ? Try some vegetarian meals using beans and peas. Add garbanzo or kidney beans to salads. Make burritos and tacos with mashed gonzalez beans or black beans. Where can you learn more? Go to http://www.chris.com/  Enter H967 in the search box to learn more about \"DASH Diet: Care Instructions. \"  Current as of: December 16, 2019               Content Version: 12.6  © 3426-5754 [employer]. Care instructions adapted under license by [employer] (which disclaims liability or warranty for this information). If you have questions about a medical condition or this instruction, always ask your healthcare professional. [address] any warranty or liability for your use of this information.

## 2021-10-20 NOTE — PROGRESS NOTES
[address] [place] [address]               [phone]      Curry Bach is a 69 y.o. male and presents with Follow Up Chronic Condition and Hypertension       Assessment/Plan:    Diagnoses and all orders for this visit:    1. Primary hypertension  -     METABOLIC PANEL, COMPREHENSIVE; Future  Endorses medication compliance, Follow up labs prior to next visit and Blood pressure stable, continue same medications   2. Stage 3a chronic kidney disease (HCC)  -     MICROALBUMIN, UR, RAND W/ MICROALB/CREAT RATIO; Future  GFR 54, check urine micor/alb ratio prior to next visit      Follow-up and Dispositions    · Return in about 4 months (around 2/20/2022) for HTN, 15 min, office only, with, fasting, labs prior. Health Maintenance:   Health Maintenance   Topic Date Due    Shingrix Vaccine Age 49> (3 of 2) Never done    AAA Screening 73-67 YO Male Smoking Patients  Never done    Flu Vaccine (1) Never done    COVID-19 Vaccine (3 - Pfizer booster) 10/13/2021    Medicare Yearly Exam  06/09/2022    Lipid Screen  03/11/2026    DTaP/Tdap/Td series (2 - Td or Tdap) 04/07/2029    Colorectal Cancer Screening Combo  12/21/2030    Hepatitis C Screening  Completed    Pneumococcal 65+ years  Completed        Subjective:    Hypertension:   Patient reports taking medications as instructed. yes   Medication side effects noted. no  Headache upon wakening. no   Home BP monitoring in range of 130    Do you experience chest pain/pressure or SOB with exertion? no  Maintain a low Sodium diet? yes  Key CAD CHF Meds             lisinopriL (PRINIVIL, ZESTRIL) 40 mg tablet (Taking) take 1 tablet by mouth once daily    hydroCHLOROthiazide (HYDRODIURIL) 25 mg tablet (Taking) Take 1 Tab by mouth daily.         BUN   Date Value Ref Range Status   09/29/2021 13 7.0 - 18 MG/DL Final     Creatinine   Date Value Ref Range Status   09/29/2021 1.30 0.6 - 1.3 MG/DL Final     GFR est AA   Date Value Ref Range Status   09/29/2021 >60 >60 ml/min/1.73m2 Final     Potassium   Date Value Ref Range Status   09/29/2021 4.2 3.5 - 5.5 mmol/L Final           ROS:     ROS  As stated in HPI, otherwise all others negative. The problem list was updated as a part of today's visit. Patient Active Problem List   Diagnosis Code    Hypertension I10    GERD (gastroesophageal reflux disease) K21.9    Personal history of tobacco use, presenting hazards to health Z87.891       The PSH, FH were reviewed. SH:  Social History     Tobacco Use    Smoking status: Former Smoker     Packs/day: 2.00     Years: 25.00     Pack years: 50.00    Smokeless tobacco: Never Used   Vaping Use    Vaping Use: Never used   Substance Use Topics    Alcohol use: Yes     Comment: Juan Edgar     Drug use: Not Currently       Medications/Allergies:  Current Outpatient Medications on File Prior to Visit   Medication Sig Dispense Refill    lisinopriL (PRINIVIL, ZESTRIL) 40 mg tablet take 1 tablet by mouth once daily 90 Tablet 1    hydroCHLOROthiazide (HYDRODIURIL) 25 mg tablet Take 1 Tab by mouth daily. 90 Tab 1    omeprazole (PRILOSEC) 40 mg capsule Take 1 Cap by mouth daily. 90 Cap 1     No current facility-administered medications on file prior to visit. No Known Allergies    Objective:  Visit Vitals  BP (!) 140/73   Pulse 64   Temp 98.6 °F (37 °C) (Temporal)   Resp 20   Ht 5' 2\" (1.575 m)   Wt 190 lb (86.2 kg)   SpO2 96%   BMI 34.75 kg/m²    Body mass index is 34.75 kg/m². Physical assessment  Physical Exam  Vitals and nursing note reviewed. Eyes:      Conjunctiva/sclera: Conjunctivae normal.      Pupils: Pupils are equal, round, and reactive to light. Neck:      Vascular: No JVD. Cardiovascular:      Rate and Rhythm: Normal rate and regular rhythm. Heart sounds: Normal heart sounds. No murmur heard. No friction rub. No gallop.     Pulmonary:      Effort: Pulmonary effort is normal.      Breath sounds: Normal breath sounds. Musculoskeletal:         General: Normal range of motion. Cervical back: Normal range of motion. Skin:     General: Skin is warm and dry. Neurological:      Mental Status: He is alert and oriented to person, place, and time. Psychiatric:         Mood and Affect: Affect normal.         Cognition and Memory: Memory normal.         Judgment: Judgment normal.           Labwork and Ancillary Studies:    CBC w/Diff  No results found for: WBC, WBCLT, HGB, HGBP, PLT, HGBEXT, PLTEXT, HGBEXT, PLTEXT      Basic Metabolic Profile  Lab Results   Component Value Date/Time    Sodium 139 09/29/2021 09:00 AM    Potassium 4.2 09/29/2021 09:00 AM    Chloride 104 09/29/2021 09:00 AM    CO2 30 09/29/2021 09:00 AM    Anion gap 5 09/29/2021 09:00 AM    Glucose 126 (H) 09/29/2021 09:00 AM    BUN 13 09/29/2021 09:00 AM    Creatinine 1.30 09/29/2021 09:00 AM    BUN/Creatinine ratio 10 (L) 09/29/2021 09:00 AM    GFR est AA >60 09/29/2021 09:00 AM    GFR est non-AA 54 (L) 09/29/2021 09:00 AM    Calcium 8.9 09/29/2021 09:00 AM        Cholesterol  Lab Results   Component Value Date/Time    Cholesterol, total 173 03/11/2021 09:48 AM    HDL Cholesterol 37 (L) 03/11/2021 09:48 AM    LDL, calculated 106.2 (H) 03/11/2021 09:48 AM    Triglyceride 149 03/11/2021 09:48 AM    CHOL/HDL Ratio 4.7 03/11/2021 09:48 AM           I have discussed the diagnosis with the patient and the intended plan as seen in the above orders. The patient has received an After-Visit Summary and questions were answered concerning future plans. An After Visit Summary was printed and given to the patient. All diagnosis have been discussed with the patient and all of the patient's questions have been answered. Follow-up and Dispositions    · Return in about 4 months (around 2/20/2022) for HTN, 15 min, office only, with, fasting, labs prior.            Jaylyn Meyer, [facility]  [address] [facility]   [address] [facility] [address].  [id number]

## 2021-11-09 NOTE — ADDENDUM NOTE
Addended by: Obed Licea on: 3/9/2021 02:51 PM     Modules accepted: Level of Service no enlarged lymph nodes

## 2022-01-07 DIAGNOSIS — I10 ESSENTIAL HYPERTENSION: ICD-10-CM

## 2022-01-09 RX ORDER — HYDROCHLOROTHIAZIDE 25 MG/1
TABLET ORAL
Qty: 90 TABLET | Refills: 1 | Status: SHIPPED | OUTPATIENT
Start: 2022-01-09 | End: 2022-08-18

## 2022-02-11 ENCOUNTER — HOSPITAL ENCOUNTER (OUTPATIENT)
Dept: LAB | Age: 72
Discharge: HOME | End: 2022-02-11
Payer: MEDICARE

## 2022-02-11 LAB
ALBUMIN SERPL-MCNC: 3.9 G/DL (ref 3.4–5)
ALBUMIN/GLOB SERPL: 1.2 {RATIO} (ref 0.8–1.7)
ALP SERPL-CCNC: 74 U/L (ref 45–117)
ALT SERPL-CCNC: 53 U/L (ref 16–61)
ANION GAP SERPL CALC-SCNC: 2 MMOL/L (ref 3–18)
AST SERPL-CCNC: 31 U/L (ref 10–38)
BILIRUB SERPL-MCNC: 0.6 MG/DL (ref 0.2–1)
BUN SERPL-MCNC: 24 MG/DL (ref 7–18)
BUN/CREAT SERPL: 18 (ref 12–20)
CALCIUM SERPL-MCNC: 9.4 MG/DL (ref 8.5–10.1)
CHLORIDE SERPL-SCNC: 106 MMOL/L (ref 100–111)
CO2 SERPL-SCNC: 31 MMOL/L (ref 21–32)
CREAT SERPL-MCNC: 1.3 MG/DL (ref 0.6–1.3)
CREAT UR-MCNC: 158 MG/DL (ref 30–125)
GLOBULIN SER CALC-MCNC: 3.3 G/DL (ref 2–4)
GLUCOSE SERPL-MCNC: 103 MG/DL (ref 74–99)
MICROALBUMIN UR-MCNC: 0.82 MG/DL (ref 0–3)
MICROALBUMIN/CREAT UR-RTO: 5 MG/G (ref 0–30)
POTASSIUM SERPL-SCNC: 4.6 MMOL/L (ref 3.5–5.5)
PROT SERPL-MCNC: 7.2 G/DL (ref 6.4–8.2)
SODIUM SERPL-SCNC: 139 MMOL/L (ref 136–145)

## 2022-02-11 PROCEDURE — 80053 COMPREHEN METABOLIC PANEL: CPT

## 2022-02-11 PROCEDURE — 36415 COLL VENOUS BLD VENIPUNCTURE: CPT

## 2022-02-11 PROCEDURE — 82043 UR ALBUMIN QUANTITATIVE: CPT

## 2022-02-17 ENCOUNTER — OFFICE VISIT (OUTPATIENT)
Dept: FAMILY MEDICINE CLINIC | Age: 72
End: 2022-02-17
Payer: MEDICARE

## 2022-02-17 VITALS
HEART RATE: 62 BPM | DIASTOLIC BLOOD PRESSURE: 63 MMHG | RESPIRATION RATE: 18 BRPM | BODY MASS INDEX: 35.15 KG/M2 | SYSTOLIC BLOOD PRESSURE: 127 MMHG | TEMPERATURE: 98.2 F | WEIGHT: 191 LBS | OXYGEN SATURATION: 96 % | HEIGHT: 62 IN

## 2022-02-17 DIAGNOSIS — K21.9 GASTROESOPHAGEAL REFLUX DISEASE, UNSPECIFIED WHETHER ESOPHAGITIS PRESENT: ICD-10-CM

## 2022-02-17 DIAGNOSIS — N18.31 STAGE 3A CHRONIC KIDNEY DISEASE (HCC): ICD-10-CM

## 2022-02-17 DIAGNOSIS — I10 PRIMARY HYPERTENSION: Primary | ICD-10-CM

## 2022-02-17 PROCEDURE — G8427 DOCREV CUR MEDS BY ELIG CLIN: HCPCS | Performed by: NURSE PRACTITIONER

## 2022-02-17 PROCEDURE — G8536 NO DOC ELDER MAL SCRN: HCPCS | Performed by: NURSE PRACTITIONER

## 2022-02-17 PROCEDURE — 99214 OFFICE O/P EST MOD 30 MIN: CPT | Performed by: NURSE PRACTITIONER

## 2022-02-17 PROCEDURE — G8417 CALC BMI ABV UP PARAM F/U: HCPCS | Performed by: NURSE PRACTITIONER

## 2022-02-17 PROCEDURE — 1101F PT FALLS ASSESS-DOCD LE1/YR: CPT | Performed by: NURSE PRACTITIONER

## 2022-02-17 PROCEDURE — G8752 SYS BP LESS 140: HCPCS | Performed by: NURSE PRACTITIONER

## 2022-02-17 PROCEDURE — 3017F COLORECTAL CA SCREEN DOC REV: CPT | Performed by: NURSE PRACTITIONER

## 2022-02-17 PROCEDURE — G8754 DIAS BP LESS 90: HCPCS | Performed by: NURSE PRACTITIONER

## 2022-02-17 PROCEDURE — G8432 DEP SCR NOT DOC, RNG: HCPCS | Performed by: NURSE PRACTITIONER

## 2022-02-17 RX ORDER — OMEPRAZOLE 40 MG/1
40 CAPSULE, DELAYED RELEASE ORAL DAILY
Qty: 90 CAPSULE | Refills: 1 | Status: SHIPPED | OUTPATIENT
Start: 2022-02-17 | End: 2022-08-18

## 2022-02-17 RX ORDER — LISINOPRIL 40 MG/1
40 TABLET ORAL DAILY
Qty: 90 TABLET | Refills: 1 | Status: SHIPPED | OUTPATIENT
Start: 2022-02-17 | End: 2022-08-19

## 2022-02-17 NOTE — PROGRESS NOTES
Room 9    Did patient bring someone? No    Did the patient have DME equipment? No     Did you take your medication today? Yes       1. \"Have you been to the ER, urgent care clinic since your last visit? Hospitalized since your last visit? \" No    2. \"Have you seen or consulted any other health care providers outside of the [address] [place] [address] since your last visit? \" No     3. For patients aged 39-70: Has the patient had a colonoscopy / FIT/ Cologuard? Yes - no Care Gap present      If the patient is female:    4. For patients aged 41-77: Has the patient had a mammogram within the past 2 years? NA - based on age or sex      11. For patients aged 21-65: Has the patient had a pap smear?  NA - based on age or sex        1 most recent PHQ Screens 2/17/2022   Little interest or pleasure in doing things Not at all   Feeling down, depressed, irritable, or hopeless Not at all   Total Score PHQ 2 0         Health Maintenance Due   Topic Date Due    Shingrix Vaccine Age 49> (1 of 2) Never done    AAA Screening 73-67 YO Male Smoking Patients  Never done    Flu Vaccine (1) Never done    COVID-19 Vaccine (3 - Booster for [employer] series) 09/13/2021       Learning Assessment 3/9/2020   PRIMARY LEARNER Patient   HIGHEST LEVEL OF EDUCATION - PRIMARY LEARNER  GRADUATED HIGH SCHOOL OR GED   BARRIERS PRIMARY LEARNER NONE   CO-LEARNER CAREGIVER No   PRIMARY LANGUAGE ENGLISH   LEARNER PREFERENCE PRIMARY LISTENING   ANSWERED BY Teodoro Long   RELATIONSHIP SELF

## 2022-02-17 NOTE — PROGRESS NOTES
[address] [place] [address]               [phone]      Myrna Troncoso is a 66 y.o. male and presents with Follow Up Chronic Condition and Hypertension       Assessment/Plan:    Diagnoses and all orders for this visit:    1. Primary hypertension  -     lisinopriL (PRINIVIL, ZESTRIL) 40 mg tablet; Take 1 Tablet by mouth daily.  -     METABOLIC PANEL, COMPREHENSIVE; Future  Endorses medication compliance, Refill provided, Follow up labs prior to next visit and Blood pressure stable, continue lisinopril/hctz   2. Gastroesophageal reflux disease, unspecified whether esophagitis present  -     omeprazole (PRILOSEC) 40 mg capsule; Take 1 Capsule by mouth daily. Refill provided  3. Stage 3a chronic kidney disease (HCC)  -     MICROALBUMIN, UR, RAND W/ MICROALB/CREAT RATIO; Future  Follow up labs prior to next visit, current GFR is 54      Follow-up and Dispositions    · Return in about 4 months (around 6/17/2022) for MAWV, HTN, 30 min, office only, with, labs prior. Health Maintenance:   Health Maintenance   Topic Date Due    Shingrix Vaccine Age 49> (3 of 2) Never done    AAA Screening 73-67 YO Male Smoking Patients  Never done    Flu Vaccine (1) Never done    COVID-19 Vaccine (3 - Booster for [employer] series) 09/13/2021    Medicare Yearly Exam  06/09/2022    Depression Screen  10/20/2022    Lipid Screen  03/11/2026    DTaP/Tdap/Td series (2 - Td or Tdap) 04/07/2029    Colorectal Cancer Screening Combo  12/21/2030    Hepatitis C Screening  Completed    Pneumococcal 65+ years  Completed        Subjective:    Labs obtained prior to visit? YES  Reviewed with patient? Yes    Hypertension:  Visit Vitals  /63   Pulse 62   Temp 98.2 °F (36.8 °C) (Temporal)   Resp 18   Ht 5' 2\" (1.575 m)   Wt 191 lb (86.6 kg)   SpO2 96%   BMI 34.93 kg/m²      Patient reports taking medications as instructed. yes   Medication side effects noted. no  Headache upon wakening.  no Home BP monitoring: Does not check  Do you experience chest pain/pressure or SOB with exertion? no  Maintain a low Sodium diet? yes  Key CAD CHF Meds             lisinopriL (PRINIVIL, ZESTRIL) 40 mg tablet (Taking) Take 1 Tablet by mouth daily. hydroCHLOROthiazide (HYDRODIURIL) 25 mg tablet (Taking) take 1 tablet by mouth once daily        BUN   Date Value Ref Range Status   02/11/2022 24 (H) 7.0 - 18 MG/DL Final     Creatinine   Date Value Ref Range Status   02/11/2022 1.30 0.6 - 1.3 MG/DL Final     GFR est AA   Date Value Ref Range Status   02/11/2022 >60 >60 ml/min/1.73m2 Final     Potassium   Date Value Ref Range Status   02/11/2022 4.6 3.5 - 5.5 mmol/L Final           ROS:     ROS  As stated in HPI, otherwise all others negative. The problem list was updated as a part of today's visit. Patient Active Problem List   Diagnosis Code    Hypertension I10    GERD (gastroesophageal reflux disease) K21.9    Personal history of tobacco use, presenting hazards to health Z87.891    Stage 3a chronic kidney disease ([facility] [address] N18.31       The PSH, FH were reviewed. SH:  Social History     Tobacco Use    Smoking status: Former Smoker     Packs/day: 2.00     Years: 25.00     Pack years: 50.00    Smokeless tobacco: Never Used   Vaping Use    Vaping Use: Never used   Substance Use Topics    Alcohol use: Yes     Comment: Juan Edgar     Drug use: Not Currently       Medications/Allergies:  Current Outpatient Medications on File Prior to Visit   Medication Sig Dispense Refill    hydroCHLOROthiazide (HYDRODIURIL) 25 mg tablet take 1 tablet by mouth once daily 90 Tablet 1    [DISCONTINUED] lisinopriL (PRINIVIL, ZESTRIL) 40 mg tablet take 1 tablet by mouth once daily 90 Tablet 1    [DISCONTINUED] omeprazole (PRILOSEC) 40 mg capsule Take 1 Cap by mouth daily. 90 Cap 1     No current facility-administered medications on file prior to visit.         No Known Allergies    Objective:  Visit Vitals  /63   Pulse 62   Temp 98.2 °F (36.8 °C) (Temporal)   Resp 18   Ht 5' 2\" (1.575 m)   Wt 191 lb (86.6 kg)   SpO2 96%   BMI 34.93 kg/m²    Body mass index is 34.93 kg/m². Physical assessment  Physical Exam  Vitals and nursing note reviewed. Eyes:      Conjunctiva/sclera: Conjunctivae normal.      Pupils: Pupils are equal, round, and reactive to light. Cardiovascular:      Rate and Rhythm: Normal rate and regular rhythm. Heart sounds: Normal heart sounds. No murmur heard. No friction rub. No gallop. Pulmonary:      Effort: Pulmonary effort is normal.      Breath sounds: Normal breath sounds. Musculoskeletal:         General: Normal range of motion. Cervical back: Normal range of motion. Skin:     General: Skin is warm and dry. Neurological:      Mental Status: He is alert. Labwork and Ancillary Studies:    CBC w/Diff  No results found for: WBC, WBCLT, HGB, HGBP, PLT, HGBEXT, PLTEXT, HGBEXT, PLTEXT      Basic Metabolic Profile  Lab Results   Component Value Date/Time    Sodium 139 02/11/2022 08:58 AM    Potassium 4.6 02/11/2022 08:58 AM    Chloride 106 02/11/2022 08:58 AM    CO2 31 02/11/2022 08:58 AM    Anion gap 2 (L) 02/11/2022 08:58 AM    Glucose 103 (H) 02/11/2022 08:58 AM    BUN 24 (H) 02/11/2022 08:58 AM    Creatinine 1.30 02/11/2022 08:58 AM    BUN/Creatinine ratio 18 02/11/2022 08:58 AM    GFR est AA >60 02/11/2022 08:58 AM    GFR est non-AA 54 (L) 02/11/2022 08:58 AM    Calcium 9.4 02/11/2022 08:58 AM        Cholesterol  Lab Results   Component Value Date/Time    Cholesterol, total 173 03/11/2021 09:48 AM    HDL Cholesterol 37 (L) 03/11/2021 09:48 AM    LDL, calculated 106.2 (H) 03/11/2021 09:48 AM    Triglyceride 149 03/11/2021 09:48 AM    CHOL/HDL Ratio 4.7 03/11/2021 09:48 AM           I have discussed the diagnosis with the patient and the intended plan as seen in the above orders. The patient has received an After-Visit Summary and questions were answered concerning future plans. An After Visit Summary was printed and given to the patient. All diagnosis have been discussed with the patient and all of the patient's questions have been answered. Follow-up and Dispositions    · Return in about 4 months (around 6/17/2022) for [address] [place] [address]. [address], [facility], 30 min, office only, with, labs prior. [facility]  [address] [place] [address] [address]  [id number]

## 2022-03-19 PROBLEM — K21.9 GERD (GASTROESOPHAGEAL REFLUX DISEASE): Status: ACTIVE | Noted: 2020-03-09

## 2022-03-19 PROBLEM — Z87.891 PERSONAL HISTORY OF TOBACCO USE, PRESENTING HAZARDS TO HEALTH: Status: ACTIVE | Noted: 2020-09-09

## 2022-03-19 PROBLEM — N18.31 STAGE 3A CHRONIC KIDNEY DISEASE (HCC): Status: ACTIVE | Noted: 2022-02-17

## 2022-03-19 PROBLEM — I10 HYPERTENSION: Status: ACTIVE | Noted: 2020-03-09

## 2022-08-18 DIAGNOSIS — K21.9 GASTROESOPHAGEAL REFLUX DISEASE, UNSPECIFIED WHETHER ESOPHAGITIS PRESENT: ICD-10-CM

## 2022-08-18 DIAGNOSIS — I10 ESSENTIAL HYPERTENSION: ICD-10-CM

## 2022-08-18 RX ORDER — OMEPRAZOLE 40 MG/1
CAPSULE, DELAYED RELEASE ORAL
Qty: 90 CAPSULE | Refills: 1 | Status: SHIPPED | OUTPATIENT
Start: 2022-08-18

## 2022-08-18 RX ORDER — HYDROCHLOROTHIAZIDE 25 MG/1
TABLET ORAL
Qty: 90 TABLET | Refills: 1 | Status: SHIPPED | OUTPATIENT
Start: 2022-08-18

## 2022-08-19 DIAGNOSIS — I10 PRIMARY HYPERTENSION: ICD-10-CM

## 2022-08-19 RX ORDER — LISINOPRIL 40 MG/1
TABLET ORAL
Qty: 90 TABLET | Refills: 1 | Status: SHIPPED | OUTPATIENT
Start: 2022-08-19

## 2022-08-23 ENCOUNTER — TELEPHONE (OUTPATIENT)
Dept: FAMILY MEDICINE CLINIC | Age: 72
End: 2022-08-23

## 2022-08-23 NOTE — TELEPHONE ENCOUNTER
Patient called and stated that he had covid but has recovered from it. The only thing that is left is a productive cough with light green phlegm. He has been taking Thera flu without relief. Patient is requesting an antibiotic. Your schedule was reviewed and there are no available appointments until 10-19. Patient does not have my chart to do a virtual or E visit.  Patient can be reached at [phone]

## 2022-08-24 ENCOUNTER — TELEPHONE (OUTPATIENT)
Dept: FAMILY MEDICINE CLINIC | Age: 72
End: 2022-08-24

## 2022-08-24 NOTE — TELEPHONE ENCOUNTER
Spoke to patient in regards of message stating I am Covid positive and I have been coughing I would like some sort of antibiotics to help or stop the coughing. Patient states that coughing has slowed down and is getting a kelsie better. Patient states due to coughing my lower stomach has been a little sore. Per ESVIN Gonzalez instructions patient can make anther appointment or go to Urgent Care but it is coming from the coughing fits. Patient verbalized understanding and states I that the now calmed down since then.

## 2022-09-28 ENCOUNTER — HOSPITAL ENCOUNTER (OUTPATIENT)
Dept: LAB | Age: 72
Discharge: HOME | End: 2022-09-28
Payer: MEDICARE

## 2022-09-28 LAB
ALBUMIN SERPL-MCNC: 3.9 G/DL (ref 3.4–5)
ALBUMIN/GLOB SERPL: 1.1 {RATIO} (ref 0.8–1.7)
ALP SERPL-CCNC: 81 U/L (ref 45–117)
ALT SERPL-CCNC: 44 U/L (ref 16–61)
ANION GAP SERPL CALC-SCNC: 9 MMOL/L (ref 3–18)
AST SERPL-CCNC: 26 U/L (ref 10–38)
BILIRUB SERPL-MCNC: 0.6 MG/DL (ref 0.2–1)
BUN SERPL-MCNC: 26 MG/DL (ref 7–18)
BUN/CREAT SERPL: 16 (ref 12–20)
CALCIUM SERPL-MCNC: 9.4 MG/DL (ref 8.5–10.1)
CHLORIDE SERPL-SCNC: 103 MMOL/L (ref 100–111)
CO2 SERPL-SCNC: 26 MMOL/L (ref 21–32)
CREAT SERPL-MCNC: 1.61 MG/DL (ref 0.6–1.3)
CREAT UR-MCNC: 234 MG/DL (ref 30–125)
GLOBULIN SER CALC-MCNC: 3.4 G/DL (ref 2–4)
GLUCOSE SERPL-MCNC: 104 MG/DL (ref 74–99)
MICROALBUMIN UR-MCNC: 0.97 MG/DL (ref 0–3)
MICROALBUMIN/CREAT UR-RTO: 4 MG/G (ref 0–30)
POTASSIUM SERPL-SCNC: 4.7 MMOL/L (ref 3.5–5.5)
PROT SERPL-MCNC: 7.3 G/DL (ref 6.4–8.2)
SODIUM SERPL-SCNC: 138 MMOL/L (ref 136–145)

## 2022-09-28 PROCEDURE — 36415 COLL VENOUS BLD VENIPUNCTURE: CPT

## 2022-09-28 PROCEDURE — 82043 UR ALBUMIN QUANTITATIVE: CPT

## 2022-09-28 PROCEDURE — 80053 COMPREHEN METABOLIC PANEL: CPT

## 2022-10-06 ENCOUNTER — OFFICE VISIT (OUTPATIENT)
Dept: FAMILY MEDICINE CLINIC | Age: 72
End: 2022-10-06
Payer: MEDICARE

## 2022-10-06 VITALS
HEIGHT: 62 IN | SYSTOLIC BLOOD PRESSURE: 138 MMHG | OXYGEN SATURATION: 98 % | DIASTOLIC BLOOD PRESSURE: 64 MMHG | RESPIRATION RATE: 18 BRPM | WEIGHT: 190 LBS | HEART RATE: 60 BPM | TEMPERATURE: 98.2 F | BODY MASS INDEX: 34.96 KG/M2

## 2022-10-06 DIAGNOSIS — Z71.89 ADVANCED CARE PLANNING/COUNSELING DISCUSSION: ICD-10-CM

## 2022-10-06 DIAGNOSIS — H91.93 DECREASED HEARING OF BOTH EARS: ICD-10-CM

## 2022-10-06 DIAGNOSIS — I10 PRIMARY HYPERTENSION: ICD-10-CM

## 2022-10-06 DIAGNOSIS — Z00.00 MEDICARE ANNUAL WELLNESS VISIT, SUBSEQUENT: Primary | ICD-10-CM

## 2022-10-06 PROCEDURE — G8432 DEP SCR NOT DOC, RNG: HCPCS | Performed by: NURSE PRACTITIONER

## 2022-10-06 PROCEDURE — 99213 OFFICE O/P EST LOW 20 MIN: CPT | Performed by: NURSE PRACTITIONER

## 2022-10-06 PROCEDURE — 3017F COLORECTAL CA SCREEN DOC REV: CPT | Performed by: NURSE PRACTITIONER

## 2022-10-06 PROCEDURE — 1101F PT FALLS ASSESS-DOCD LE1/YR: CPT | Performed by: NURSE PRACTITIONER

## 2022-10-06 PROCEDURE — G8427 DOCREV CUR MEDS BY ELIG CLIN: HCPCS | Performed by: NURSE PRACTITIONER

## 2022-10-06 PROCEDURE — G8536 NO DOC ELDER MAL SCRN: HCPCS | Performed by: NURSE PRACTITIONER

## 2022-10-06 PROCEDURE — G8417 CALC BMI ABV UP PARAM F/U: HCPCS | Performed by: NURSE PRACTITIONER

## 2022-10-06 PROCEDURE — G8754 DIAS BP LESS 90: HCPCS | Performed by: NURSE PRACTITIONER

## 2022-10-06 PROCEDURE — G0439 PPPS, SUBSEQ VISIT: HCPCS | Performed by: NURSE PRACTITIONER

## 2022-10-06 PROCEDURE — 1123F ACP DISCUSS/DSCN MKR DOCD: CPT | Performed by: NURSE PRACTITIONER

## 2022-10-06 NOTE — PROGRESS NOTES
Room 7     When asked if patient has any concerns he would like to address with ESVIN Castaneda patient states no . Did patient bring someone? No    Did the patient have DME equipment? No     Did you take your medication today? Yes       1. \"Have you been to the ER, urgent care clinic since your last visit? Hospitalized since your last visit? \" No    2. \"Have you seen or consulted any other health care providers outside of the [address] [place] [address] since your last visit? \" No     3. For patients aged 39-70: Has the patient had a colonoscopy / FIT/ Cologuard? Yes - no Care Gap present      If the patient is female:    4. For patients aged 41-77: Has the patient had a mammogram within the past 2 years? NA - based on age or sex      11. For patients aged 21-65: Has the patient had a pap smear?  NA - based on age or sex        1 most recent PHQ Screens 10/6/2022   Little interest or pleasure in doing things Not at all   Feeling down, depressed, irritable, or hopeless Not at all   Total Score PHQ 2 0         Health Maintenance Due   Topic Date Due    Shingrix Vaccine Age 49> (1 of 2) Never done    AAA Screening 73-67 YO Male Smoking Patients  Never done    COVID-19 Vaccine (3 - Booster for [employer] series) 09/13/2021    Medicare Yearly Exam  06/09/2022    Flu Vaccine (1) Never done       Learning Assessment 3/9/2020   PRIMARY LEARNER Patient   HIGHEST LEVEL OF EDUCATION - PRIMARY LEARNER  GRADUATED HIGH SCHOOL OR GED   BARRIERS PRIMARY LEARNER NONE   CO-LEARNER CAREGIVER No   PRIMARY LANGUAGE ENGLISH   LEARNER PREFERENCE PRIMARY LISTENING   ANSWERED BY Shanique Aragon   RELATIONSHIP SELF

## 2022-10-06 NOTE — ACP (ADVANCE CARE PLANNING)
Advance Care Planning     General Advance Care Planning (ACP) Conversation      Date of Conversation: 10/6/2022  Conducted with: Patient with [address] Decision Maker:     Primary Decision Maker: Akash Thakkar - Other Relative - [phone]  Click here to complete [address] including selection of the Healthcare Decision Maker Relationship (ie \"Primary\")    Today we documented Decision Maker(s) consistent with Legal Next of Kin hierarchy.     Content/Action Overview:   Has NO ACP documents/care preferences - information provided, considering goals and options  Reviewed DNR/DNI and patient elects Full Code (Attempt Resuscitation)  Topics discussed: resuscitation preferences       Length of Voluntary ACP Conversation in minutes:  <16 minutes (Non-Billable)    Nimisha Marroquin NP

## 2022-10-06 NOTE — PROGRESS NOTES
[address] [place] [address]               [phone]      Allison Vallejo is a 66 y.o. male and presents with Follow Up Chronic Condition, Hypertension, and Annual Wellness Visit ( )       Assessment/Plan:    Diagnoses and all orders for this visit:    1. Medicare annual wellness visit, subsequent  Completed today to include ETOH and depression screening     2. Advanced care planning/counseling discussion  Healthcare decision maker verified and ACP Discussion performed and documented in note     3. Primary hypertension  -     METABOLIC PANEL, COMPREHENSIVE; Future  Endorses medication compliance, Follow up labs prior to next visit, and Blood pressure stable, continue lisinopril and hctz at same dose      4. Decreased hearing of both ears  -     REFERRAL TO AUDIOLOGY  Endorses decreased hearing bilateral, left greater than right, referral placed for testing    Follow-up and Dispositions    Return in about 4 months (around 2/6/2023) for HTN, 15 min, office only, w/labs prior. Health Maintenance:   Health Maintenance   Topic Date Due    Shingrix Vaccine Age 49> (1 of 2) Never done    AAA Screening 73-69 YO Male Smoking Patients  Never done    COVID-19 Vaccine (3 - Booster for Pfizer series) 09/13/2021    Flu Vaccine (1) Never done    Depression Screen  02/17/2023    Medicare Yearly Exam  10/07/2023    Lipid Screen  03/11/2026    DTaP/Tdap/Td series (2 - Td or Tdap) 04/07/2029    Colorectal Cancer Screening Combo  12/21/2030    Hepatitis C Screening  Completed    Pneumococcal 65+ years  Completed        Subjective:    Labs obtained prior to visit? YES  Reviewed with patient? Yes    Hypertension:  Visit Vitals  /64   Pulse 60   Temp 98.2 °F (36.8 °C) (Temporal)   Resp 18   Ht 5' 2\" (1.575 m)   Wt 190 lb (86.2 kg)   SpO2 98%   BMI 34.75 kg/m²      Patient reports taking medications as instructed. yes   Medication side effects noted. no  Headache upon wakening.  no Home BP monitorin/60-70  Do you experience chest pain/pressure or SOB with exertion? no  Maintain a low Sodium diet? no  Key CAD CHF Meds               lisinopriL (PRINIVIL, ZESTRIL) 40 mg tablet (Taking) take 1 tablet by mouth once daily    hydroCHLOROthiazide (HYDRODIURIL) 25 mg tablet (Taking) take 1 tablet by mouth once daily          BUN   Date Value Ref Range Status   2022 26 (H) 7.0 - 18 MG/DL Final     Creatinine   Date Value Ref Range Status   2022 1.61 (H) 0.6 - 1.3 MG/DL Final     GFR est AA   Date Value Ref Range Status   2022 51 (L) >60 ml/min/1.73m2 Final     Potassium   Date Value Ref Range Status   2022 4.7 3.5 - 5.5 mmol/L Final             ROS:     ROS  As stated in HPI, otherwise all others negative. The problem list was updated as a part of today's visit. Patient Active Problem List   Diagnosis Code    Hypertension I10    GERD (gastroesophageal reflux disease) K21.9    Personal history of tobacco use, presenting hazards to health Z87.891    Stage 3a chronic kidney disease ([facility] [address] N18.31       The PSH, FH were reviewed. SH:  Social History     Tobacco Use    Smoking status: Former     Packs/day: 2.00     Years: 25.00     Pack years: 50.00     Types: Cigarettes    Smokeless tobacco: Never   Vaping Use    Vaping Use: Never used   Substance Use Topics    Alcohol use: Yes     Comment: Juan Beam Honey     Drug use: Not Currently       Medications/Allergies:  Current Outpatient Medications on File Prior to Visit   Medication Sig Dispense Refill    lisinopriL (PRINIVIL, ZESTRIL) 40 mg tablet take 1 tablet by mouth once daily 90 Tablet 1    omeprazole (PRILOSEC) 40 mg capsule take 1 capsule by mouth once daily 90 Capsule 1    hydroCHLOROthiazide (HYDRODIURIL) 25 mg tablet take 1 tablet by mouth once daily 90 Tablet 1     No current facility-administered medications on file prior to visit.         No Known Allergies    Objective:  Visit Vitals  /64   Pulse 60 Temp 98.2 °F (36.8 °C) (Temporal)   Resp 18   Ht 5' 2\" (1.575 m)   Wt 190 lb (86.2 kg)   SpO2 98%   BMI 34.75 kg/m²    Body mass index is 34.75 kg/m². Physical assessment  Physical Exam  Vitals and nursing note reviewed. Eyes:      Conjunctiva/sclera: Conjunctivae normal.      Pupils: Pupils are equal, round, and reactive to light. Cardiovascular:      Rate and Rhythm: Normal rate and regular rhythm. Heart sounds: Normal heart sounds. No murmur heard. No friction rub. No gallop. Pulmonary:      Effort: Pulmonary effort is normal.      Breath sounds: Normal breath sounds. Musculoskeletal:         General: Normal range of motion. Cervical back: Normal range of motion. Skin:     General: Skin is warm and dry. Neurological:      Mental Status: He is alert. Labwork and Ancillary Studies:    CBC w/Diff  No results found for: WBC, WBCLT, HGB, HGBP, PLT, HGBEXT, PLTEXT, HGBEXT, PLTEXT      Basic Metabolic Profile  Lab Results   Component Value Date/Time    Sodium 138 09/28/2022 09:40 AM    Potassium 4.7 09/28/2022 09:40 AM    Chloride 103 09/28/2022 09:40 AM    CO2 26 09/28/2022 09:40 AM    Anion gap 9 09/28/2022 09:40 AM    Glucose 104 (H) 09/28/2022 09:40 AM    BUN 26 (H) 09/28/2022 09:40 AM    Creatinine 1.61 (H) 09/28/2022 09:40 AM    BUN/Creatinine ratio 16 09/28/2022 09:40 AM    GFR est AA 51 (L) 09/28/2022 09:40 AM    GFR est non-AA 42 (L) 09/28/2022 09:40 AM    Calcium 9.4 09/28/2022 09:40 AM        Cholesterol  Lab Results   Component Value Date/Time    Cholesterol, total 173 03/11/2021 09:48 AM    HDL Cholesterol 37 (L) 03/11/2021 09:48 AM    LDL, calculated 106.2 (H) 03/11/2021 09:48 AM    Triglyceride 149 03/11/2021 09:48 AM    CHOL/HDL Ratio 4.7 03/11/2021 09:48 AM           I have discussed the diagnosis with the patient and the intended plan as seen in the above orders. The patient has received an After-Visit Summary and questions were answered concerning future plans. An After Visit Summary was printed and given to the patient. All diagnosis have been discussed with the patient and all of the patient's questions have been answered. Follow-up and Dispositions    Return in about 4 months (around 2/6/2023) for HTN, 15 min, office only, w/labs prior. Thiago Tucker, AGNP-BC  [address] [address]  This is the Subsequent Medicare Annual Wellness Exam, performed 12 months or more after the Initial AWV or the last Subsequent AWV    I have reviewed the patient's medical history in detail and updated the computerized patient record. Assessment/Plan   Education and counseling provided:  Are appropriate based on today's review and evaluation    1. Medicare annual wellness visit, subsequent  2. Advanced care planning/counseling discussion  3. Primary hypertension  -     METABOLIC PANEL, COMPREHENSIVE; Future  4. Decreased hearing of both ears  -     REFERRAL TO AUDIOLOGY       Depression Risk Factor Screening     3 most recent PHQ Screens 10/6/2022   Little interest or pleasure in doing things Not at all   Feeling down, depressed, irritable, or hopeless Not at all   Total Score PHQ 2 0       Alcohol & Drug Abuse Risk Screen    Do you average more than 1 drink per night or more than 7 drinks a week: No    In the past three months have you have had more than 4 drinks containing alcohol on one occasion: No          Functional Ability and Level of Safety    Hearing: The patient needs further evaluation. Has worn hearing aids in the past, wishes to be re-tested      Activities of Daily Living: The home contains: handrails and grab bars  Patient does total self care      Ambulation: with no difficulty     Fall Risk:  Fall Risk Assessment, last 12 mths 2/17/2022   Able to walk? Yes   Fall in past 12 months? 0   Do you feel unsteady?  0   Are you worried about falling 0 Abuse Screen:  Patient is not abused       Cognitive Screening    Has your family/caregiver stated any concerns about your memory: no     Cognitive Screening: Normal - Clock Drawing Test    Health Maintenance Due     Health Maintenance Due   Topic Date Due    Shingrix Vaccine Age 49> (1 of 2) Never done    AAA Screening 73-67 YO Male Smoking Patients  Never done    COVID-19 Vaccine (3 - Booster for [employer] series) 09/13/2021    Flu Vaccine (1) Never done       Patient Care Team   Patient Care Team:  Apoorva Brown NP as PCP - General (Nurse Practitioner)  Apoorva Brown NP as PCP - [facility] Empaneled Provider    History     Patient Active Problem List   Diagnosis Code    Hypertension I10    GERD (gastroesophageal reflux disease) K21.9    Personal history of tobacco use, presenting hazards to health Z87.891    Stage 3a chronic kidney disease ([facility] [address] N18.31     Past Medical History:   Diagnosis Date    GERD (gastroesophageal reflux disease)     Hypertension       Past Surgical History:   Procedure Laterality Date    HX ORTHOPAEDIC      knee rt 2018     Current Outpatient Medications   Medication Sig Dispense Refill    lisinopriL (PRINIVIL, ZESTRIL) 40 mg tablet take 1 tablet by mouth once daily 90 Tablet 1    omeprazole (PRILOSEC) 40 mg capsule take 1 capsule by mouth once daily 90 Capsule 1    hydroCHLOROthiazide (HYDRODIURIL) 25 mg tablet take 1 tablet by mouth once daily 90 Tablet 1     No Known Allergies    Family History   Problem Relation Age of Onset    No Known Problems Mother     No Known Problems Father      Social History     Tobacco Use    Smoking status: Former     Packs/day: 2.00     Years: 25.00     Pack years: 50.00     Types: Cigarettes    Smokeless tobacco: Never   Substance Use Topics    Alcohol use: Yes     Comment: Nicole Lieberman NP

## 2022-10-06 NOTE — PATIENT INSTRUCTIONS
Medicare Wellness Visit, Male    The best way to live healthy is to have a lifestyle where you eat a well-balanced diet, exercise regularly, limit alcohol use, and quit all forms of tobacco/nicotine, if applicable. Regular preventive services are another way to keep healthy. Preventive services (vaccines, screening tests, monitoring & exams) can help personalize your care plan, which helps you manage your own care. Screening tests can find health problems at the earliest stages, when they are easiest to treat. [facility] follows the current, evidence-based guidelines published by the [facility] ([facility]) when recommending preventive services for our patients. Because we follow these guidelines, sometimes recommendations change over time as research supports it. (For example, a prostate screening blood test is no longer routinely recommended for men with no symptoms). Of course, you and your doctor may decide to screen more often for some diseases, based on your risk and co-morbidities (chronic disease you are already diagnosed with). Preventive services for you include:  - Medicare offers their members a free annual wellness visit, which is time for you and your primary care provider to discuss and plan for your preventive service needs. Take advantage of this benefit every year!  -All adults over age 72 should receive the recommended pneumonia vaccines. Current USPSTF guidelines recommend a series of two vaccines for the best pneumonia protection.   -All adults should have a flu vaccine yearly and tetanus vaccine every 10 years.  -All adults age 48 and older should receive the shingles vaccines (series of two vaccines).        -All adults age 38-68 who are overweight should have a diabetes screening test once every three years.   -Other screening tests & preventive services for persons with diabetes include: an eye exam to screen for diabetic retinopathy, a kidney function test, a foot exam, and stricter control over your cholesterol.   -Cardiovascular screening for adults with routine risk involves an electrocardiogram (ECG) at intervals determined by the provider.   -Colorectal cancer screening should be done for adults age 54-65 with no increased risk factors for colorectal cancer. There are a number of acceptable methods of screening for this type of cancer. Each test has its own benefits and drawbacks. Discuss with your provider what is most appropriate for you during your annual wellness visit. The different tests include: colonoscopy (considered the best screening method), a fecal occult blood test, a fecal DNA test, and sigmoidoscopy.  -All adults born between [facility] should be screened once for Hepatitis C.  -An Abdominal Aortic Aneurysm (AAA) Screening is recommended for men age 73-68 who has ever smoked in their lifetime.      Here is a list of your current Health Maintenance items (your personalized list of preventive services) with a due date:  Health Maintenance Due   Topic Date Due    Shingles Vaccine (1 of 2) Never done    AAA Screening  Never done    COVID-19 Vaccine (3 - Booster for Pfizer series) 09/13/2021    Yearly Flu Vaccine (1) Never done

## 2023-01-31 ENCOUNTER — HOSPITAL ENCOUNTER (OUTPATIENT)
Dept: LAB | Age: 73
Discharge: HOME | End: 2023-01-31
Payer: MEDICARE

## 2023-01-31 LAB
ALBUMIN SERPL-MCNC: 3.6 G/DL (ref 3.4–5)
ALBUMIN/GLOB SERPL: 1.1 (ref 0.8–1.7)
ALP SERPL-CCNC: 81 U/L (ref 45–117)
ALT SERPL-CCNC: 41 U/L (ref 16–61)
ANION GAP SERPL CALC-SCNC: 5 MMOL/L (ref 3–18)
AST SERPL-CCNC: 25 U/L (ref 10–38)
BILIRUB SERPL-MCNC: 0.3 MG/DL (ref 0.2–1)
BUN SERPL-MCNC: 22 MG/DL (ref 7–18)
BUN/CREAT SERPL: 16 (ref 12–20)
CALCIUM SERPL-MCNC: 8.9 MG/DL (ref 8.5–10.1)
CHLORIDE SERPL-SCNC: 106 MMOL/L (ref 100–111)
CO2 SERPL-SCNC: 27 MMOL/L (ref 21–32)
CREAT SERPL-MCNC: 1.37 MG/DL (ref 0.6–1.3)
GLOBULIN SER CALC-MCNC: 3.3 G/DL (ref 2–4)
GLUCOSE SERPL-MCNC: 111 MG/DL (ref 74–99)
POTASSIUM SERPL-SCNC: 4.3 MMOL/L (ref 3.5–5.5)
PROT SERPL-MCNC: 6.9 G/DL (ref 6.4–8.2)
SODIUM SERPL-SCNC: 138 MMOL/L (ref 136–145)

## 2023-01-31 PROCEDURE — 80053 COMPREHEN METABOLIC PANEL: CPT

## 2023-01-31 PROCEDURE — 36415 COLL VENOUS BLD VENIPUNCTURE: CPT

## 2023-02-07 ENCOUNTER — OFFICE VISIT (OUTPATIENT)
Dept: FAMILY MEDICINE CLINIC | Age: 73
End: 2023-02-07
Payer: MEDICARE

## 2023-02-07 VITALS
TEMPERATURE: 98 F | RESPIRATION RATE: 18 BRPM | OXYGEN SATURATION: 98 % | HEART RATE: 78 BPM | HEIGHT: 62 IN | DIASTOLIC BLOOD PRESSURE: 84 MMHG | BODY MASS INDEX: 34.96 KG/M2 | WEIGHT: 190 LBS | SYSTOLIC BLOOD PRESSURE: 130 MMHG

## 2023-02-07 DIAGNOSIS — M25.561 CHRONIC PAIN OF RIGHT KNEE: ICD-10-CM

## 2023-02-07 DIAGNOSIS — G89.29 CHRONIC PAIN OF RIGHT KNEE: ICD-10-CM

## 2023-02-07 DIAGNOSIS — I10 PRIMARY HYPERTENSION: Primary | ICD-10-CM

## 2023-02-07 DIAGNOSIS — N18.31 STAGE 3A CHRONIC KIDNEY DISEASE (HCC): ICD-10-CM

## 2023-02-07 DIAGNOSIS — K21.9 GASTROESOPHAGEAL REFLUX DISEASE, UNSPECIFIED WHETHER ESOPHAGITIS PRESENT: ICD-10-CM

## 2023-02-07 DIAGNOSIS — Z87.891 PERSONAL HISTORY OF TOBACCO USE, PRESENTING HAZARDS TO HEALTH: ICD-10-CM

## 2023-02-07 DIAGNOSIS — Z13.6 ENCOUNTER FOR SCREENING FOR CARDIOVASCULAR DISORDERS: ICD-10-CM

## 2023-02-07 PROCEDURE — 99214 OFFICE O/P EST MOD 30 MIN: CPT | Performed by: NURSE PRACTITIONER

## 2023-02-07 PROCEDURE — 3075F SYST BP GE 130 - 139MM HG: CPT | Performed by: NURSE PRACTITIONER

## 2023-02-07 PROCEDURE — 3078F DIAST BP <80 MM HG: CPT | Performed by: NURSE PRACTITIONER

## 2023-02-07 PROCEDURE — G8536 NO DOC ELDER MAL SCRN: HCPCS | Performed by: NURSE PRACTITIONER

## 2023-02-07 PROCEDURE — 3017F COLORECTAL CA SCREEN DOC REV: CPT | Performed by: NURSE PRACTITIONER

## 2023-02-07 PROCEDURE — G8427 DOCREV CUR MEDS BY ELIG CLIN: HCPCS | Performed by: NURSE PRACTITIONER

## 2023-02-07 PROCEDURE — 1101F PT FALLS ASSESS-DOCD LE1/YR: CPT | Performed by: NURSE PRACTITIONER

## 2023-02-07 PROCEDURE — G8432 DEP SCR NOT DOC, RNG: HCPCS | Performed by: NURSE PRACTITIONER

## 2023-02-07 PROCEDURE — G8417 CALC BMI ABV UP PARAM F/U: HCPCS | Performed by: NURSE PRACTITIONER

## 2023-02-07 PROCEDURE — 1123F ACP DISCUSS/DSCN MKR DOCD: CPT | Performed by: NURSE PRACTITIONER

## 2023-02-07 RX ORDER — OMEPRAZOLE 40 MG/1
40 CAPSULE, DELAYED RELEASE ORAL DAILY
Qty: 90 CAPSULE | Refills: 1 | Status: SHIPPED | OUTPATIENT
Start: 2023-02-07

## 2023-02-07 RX ORDER — LISINOPRIL 40 MG/1
40 TABLET ORAL DAILY
Qty: 90 TABLET | Refills: 1 | Status: SHIPPED | OUTPATIENT
Start: 2023-02-07

## 2023-02-07 RX ORDER — HYDROCHLOROTHIAZIDE 25 MG/1
25 TABLET ORAL DAILY
Qty: 90 TABLET | Refills: 1 | Status: SHIPPED | OUTPATIENT
Start: 2023-02-07

## 2023-02-07 NOTE — PROGRESS NOTES
[address] [place] [address]               [phone]      Chris Valadez is a 67 y.o. male and presents with Follow Up Chronic Condition and Hypertension       Assessment/Plan:    Diagnoses and all orders for this visit:    1. Primary hypertension  -     hydroCHLOROthiazide (HYDRODIURIL) 25 mg tablet; Take 1 Tablet by mouth daily. -     lisinopriL (PRINIVIL, ZESTRIL) 40 mg tablet; Take 1 Tablet by mouth daily.  -     METABOLIC PANEL, COMPREHENSIVE; Future  Endorses medication compliance, Refill provided, Follow up labs prior to next visit, and Blood pressure stable, continue lisinopril and hctz at same dose    2. Gastroesophageal reflux disease, unspecified whether esophagitis present  -     omeprazole (PRILOSEC) 40 mg capsule; Take 1 Capsule by mouth daily. States gerd well controlled, refill provided  3. Stage 3a chronic kidney disease ([facility] [address]  Assessment & Plan:   well controlled, continue current medications, continue current treatment plan      4. Personal history of tobacco use, presenting hazards to health  -     [address] AAA; Future    5. Chronic pain of right knee  Paperwork for handicapped placard completed  6. Encounter for screening for cardiovascular disorders   -     US EXAM SCREENING AAA; Future      Follow-up and Dispositions    Return in about 4 months (around 6/7/2023) for HTN, 15 min, office only, w/labs prior.            Health Maintenance:   Health Maintenance   Topic Date Due    Shingles Vaccine (1 of 2) Never done    AAA Screening 73-67 YO Male Smoking Patients  Never done    COVID-19 Vaccine (3 - Booster for Pfizer series) 06/08/2021    Flu Vaccine (1) Never done    Depression Screen  10/06/2023    Medicare Yearly Exam  10/07/2023    GFR test (Diabetes, CKD 3-4, OR last GFR 15-59)  01/31/2024    Lipid Screen  03/11/2026    DTaP/Tdap/Td series (2 - Td or Tdap) 04/07/2029    Colorectal Cancer Screening Combo  12/21/2030    Hepatitis C Screening Completed    Pneumococcal 65+ years  Completed        Subjective:    Labs obtained prior to visit? YES  Reviewed with patient? Yes    Hypertension:  Visit Vitals  /84   Pulse 78   Temp 98 °F (36.7 °C) (Temporal)   Resp 18   Ht 5' 2\" (1.575 m)   Wt 190 lb (86.2 kg)   SpO2 98%   BMI 34.75 kg/m²      Patient reports taking medications as instructed. yes   Medication side effects noted. no  Headache upon wakening. no   Home BP monitorin/71  Do you experience chest pain/pressure or SOB with exertion? no  Maintain a low Sodium diet? yes  Key CAD CHF Meds               hydroCHLOROthiazide (HYDRODIURIL) 25 mg tablet (Taking) Take 1 Tablet by mouth daily. lisinopriL (PRINIVIL, ZESTRIL) 40 mg tablet (Taking) Take 1 Tablet by mouth daily. BUN   Date Value Ref Range Status   2023 22 (H) 7.0 - 18 MG/DL Final     Creatinine   Date Value Ref Range Status   2023 1.37 (H) 0.6 - 1.3 MG/DL Final     GFR est AA   Date Value Ref Range Status   2022 51 (L) >60 ml/min/1.73m2 Final     Potassium   Date Value Ref Range Status   2023 4.3 3.5 - 5.5 mmol/L Final           ROS:     ROS  As stated in HPI, otherwise all others negative. The problem list was updated as a part of today's visit. Patient Active Problem List   Diagnosis Code    Hypertension I10    GERD (gastroesophageal reflux disease) K21.9    Personal history of tobacco use, presenting hazards to health Z87.891    Stage 3a chronic kidney disease ([facility] [address] N18.31    Chronic pain of right knee M25.561, G89.29       The PSH, FH were reviewed.       SH:  Social History     Tobacco Use    Smoking status: Former     Packs/day: 2.00     Years: 25.00     Pack years: 50.00     Types: Cigarettes    Smokeless tobacco: Never   Vaping Use    Vaping Use: Never used   Substance Use Topics    Alcohol use: Yes     Comment: [employer]     Drug use: Not Currently       Medications/Allergies:  Current Outpatient Medications on File Prior to Visit Medication Sig Dispense Refill    [DISCONTINUED] lisinopriL (PRINIVIL, ZESTRIL) 40 mg tablet take 1 tablet by mouth once daily 90 Tablet 1    [DISCONTINUED] omeprazole (PRILOSEC) 40 mg capsule take 1 capsule by mouth once daily 90 Capsule 1    [DISCONTINUED] hydroCHLOROthiazide (HYDRODIURIL) 25 mg tablet take 1 tablet by mouth once daily 90 Tablet 1     No current facility-administered medications on file prior to visit. No Known Allergies    Objective:  Visit Vitals  /84   Pulse 78   Temp 98 °F (36.7 °C) (Temporal)   Resp 18   Ht 5' 2\" (1.575 m)   Wt 190 lb (86.2 kg)   SpO2 98%   BMI 34.75 kg/m²    Body mass index is 34.75 kg/m². Physical assessment  Physical Exam  Vitals and nursing note reviewed. Eyes:      Conjunctiva/sclera: Conjunctivae normal.      Pupils: Pupils are equal, round, and reactive to light. Cardiovascular:      Rate and Rhythm: Normal rate and regular rhythm. Heart sounds: Normal heart sounds. No murmur heard. No friction rub. No gallop. Pulmonary:      Effort: Pulmonary effort is normal.      Breath sounds: Normal breath sounds. Musculoskeletal:         General: Normal range of motion. Cervical back: Normal range of motion. Skin:     General: Skin is warm and dry. Neurological:      Mental Status: He is alert.          Labwork and Ancillary Studies:    CBC w/Diff  No results found for: WBC, WBCLT, HGB, HGBP, PLT, HGBEXT, PLTEXT, HGBEXT, PLTEXT      Basic Metabolic Profile  Lab Results   Component Value Date/Time    Sodium 138 01/31/2023 08:09 AM    Potassium 4.3 01/31/2023 08:09 AM    Chloride 106 01/31/2023 08:09 AM    CO2 27 01/31/2023 08:09 AM    Anion gap 5 01/31/2023 08:09 AM    Glucose 111 (H) 01/31/2023 08:09 AM    BUN 22 (H) 01/31/2023 08:09 AM    Creatinine 1.37 (H) 01/31/2023 08:09 AM    BUN/Creatinine ratio 16 01/31/2023 08:09 AM    GFR est AA 51 (L) 09/28/2022 09:40 AM    GFR est non-AA 42 (L) 09/28/2022 09:40 AM    Calcium 8.9 01/31/2023 08:09 AM        Cholesterol  Lab Results   Component Value Date/Time    Cholesterol, total 173 03/11/2021 09:48 AM    HDL Cholesterol 37 (L) 03/11/2021 09:48 AM    LDL, calculated 106.2 (H) 03/11/2021 09:48 AM    Triglyceride 149 03/11/2021 09:48 AM    CHOL/HDL Ratio 4.7 03/11/2021 09:48 AM           I have discussed the diagnosis with the patient and the intended plan as seen in the above orders. The patient has received an After-Visit Summary and questions were answered concerning future plans. An After Visit Summary was printed and given to the patient. All diagnosis have been discussed with the patient and all of the patient's questions have been answered. Follow-up and Dispositions    Return in about 4 months (around 6/7/2023) for HTN, 15 min, office only, w/labs prior. Justin Mask, AGNP-BC  [address]  [id number]

## 2023-02-07 NOTE — PROGRESS NOTES
Room 7     When asked if patient has seen the Audiologist patient states no . Patient states I feel like I do not need to go . When asked if patient has any concerns he would like to address with ESVIN Castaneda patient states no . Did patient bring someone? No    Did the patient have DME equipment? No     Did you take your medication today? Yes       1. \"Have you been to the ER, urgent care clinic since your last visit? Hospitalized since your last visit? \" No    2. \"Have you seen or consulted any other health care providers outside of the [address] [place] [address] since your last visit? \" No     3. For patients aged 39-70: Has the patient had a colonoscopy / FIT/ Cologuard? Yes - no Care Gap present      If the patient is female:    4. For patients aged 41-77: Has the patient had a mammogram within the past 2 years? NA - based on age or sex      11. For patients aged 21-65: Has the patient had a pap smear?  NA - based on age or sex        3 most recent PHQ Screens 2/7/2023   Little interest or pleasure in doing things Not at all   Feeling down, depressed, irritable, or hopeless Not at all   Total Score PHQ 2 0         Health Maintenance Due   Topic Date Due    Shingles Vaccine (1 of 2) Never done    AAA Screening 73-67 YO Male Smoking Patients  Never done    COVID-19 Vaccine (3 - Booster for Pfizer series) 06/08/2021    Flu Vaccine (1) Never done       Learning Assessment 3/9/2020   PRIMARY LEARNER Patient   HIGHEST LEVEL OF EDUCATION - PRIMARY LEARNER  GRADUATED HIGH SCHOOL OR GED   BARRIERS PRIMARY LEARNER NONE   CO-LEARNER CAREGIVER No   PRIMARY LANGUAGE ENGLISH   LEARNER PREFERENCE PRIMARY LISTENING   ANSWERED BY Maria Isabel Castillo 19

## 2023-02-11 ENCOUNTER — TRANSCRIBE ORDERS (OUTPATIENT)
Facility: HOSPITAL | Age: 73
End: 2023-02-11

## 2023-02-11 DIAGNOSIS — Z87.891 PERSONAL HISTORY OF TOBACCO USE, PRESENTING HAZARDS TO HEALTH: Primary | ICD-10-CM

## 2023-02-11 DIAGNOSIS — Z13.6 ENCOUNTER FOR SCREENING FOR CARDIOVASCULAR DISORDERS: ICD-10-CM

## 2023-05-15 ENCOUNTER — TELEPHONE (OUTPATIENT)
Facility: CLINIC | Age: 73
End: 2023-05-15

## 2023-06-08 DIAGNOSIS — I10 PRIMARY HYPERTENSION: Primary | ICD-10-CM

## 2023-06-15 ENCOUNTER — HOSPITAL ENCOUNTER (OUTPATIENT)
Facility: HOSPITAL | Age: 73
Setting detail: SPECIMEN
Discharge: HOME | End: 2023-06-18
Payer: MEDICARE

## 2023-06-15 LAB
ALBUMIN SERPL-MCNC: 3.7 G/DL (ref 3.4–5)
ALBUMIN/GLOB SERPL: 1.2 (ref 0.8–1.7)
ALP SERPL-CCNC: 79 U/L (ref 45–117)
ALT SERPL-CCNC: 51 U/L (ref 16–61)
ANION GAP SERPL CALC-SCNC: 6 MMOL/L (ref 3–18)
AST SERPL-CCNC: 31 U/L (ref 10–38)
BILIRUB SERPL-MCNC: 0.7 MG/DL (ref 0.2–1)
BUN SERPL-MCNC: 22 MG/DL (ref 7–18)
BUN/CREAT SERPL: 16 (ref 12–20)
CALCIUM SERPL-MCNC: 9 MG/DL (ref 8.5–10.1)
CHLORIDE SERPL-SCNC: 106 MMOL/L (ref 100–111)
CO2 SERPL-SCNC: 27 MMOL/L (ref 21–32)
CREAT SERPL-MCNC: 1.4 MG/DL (ref 0.6–1.3)
GLOBULIN SER CALC-MCNC: 3.2 G/DL (ref 2–4)
GLUCOSE SERPL-MCNC: 111 MG/DL (ref 74–99)
POTASSIUM SERPL-SCNC: 4.3 MMOL/L (ref 3.5–5.5)
PROT SERPL-MCNC: 6.9 G/DL (ref 6.4–8.2)
SODIUM SERPL-SCNC: 139 MMOL/L (ref 136–145)

## 2023-06-15 PROCEDURE — 80053 COMPREHEN METABOLIC PANEL: CPT

## 2023-06-15 PROCEDURE — 36415 COLL VENOUS BLD VENIPUNCTURE: CPT

## 2023-06-22 ENCOUNTER — OFFICE VISIT (OUTPATIENT)
Facility: CLINIC | Age: 73
End: 2023-06-22
Payer: MEDICARE

## 2023-06-22 VITALS
RESPIRATION RATE: 18 BRPM | HEIGHT: 62 IN | SYSTOLIC BLOOD PRESSURE: 137 MMHG | WEIGHT: 189 LBS | OXYGEN SATURATION: 96 % | HEART RATE: 63 BPM | TEMPERATURE: 98 F | BODY MASS INDEX: 34.78 KG/M2 | DIASTOLIC BLOOD PRESSURE: 61 MMHG

## 2023-06-22 DIAGNOSIS — K21.9 GASTROESOPHAGEAL REFLUX DISEASE, UNSPECIFIED WHETHER ESOPHAGITIS PRESENT: ICD-10-CM

## 2023-06-22 DIAGNOSIS — I10 PRIMARY HYPERTENSION: Primary | ICD-10-CM

## 2023-06-22 PROBLEM — N18.31 STAGE 3A CHRONIC KIDNEY DISEASE (HCC): Status: ACTIVE | Noted: 2022-02-17

## 2023-06-22 PROCEDURE — 1036F TOBACCO NON-USER: CPT | Performed by: NURSE PRACTITIONER

## 2023-06-22 PROCEDURE — 3078F DIAST BP <80 MM HG: CPT | Performed by: NURSE PRACTITIONER

## 2023-06-22 PROCEDURE — 3017F COLORECTAL CA SCREEN DOC REV: CPT | Performed by: NURSE PRACTITIONER

## 2023-06-22 PROCEDURE — 99213 OFFICE O/P EST LOW 20 MIN: CPT | Performed by: NURSE PRACTITIONER

## 2023-06-22 PROCEDURE — 3075F SYST BP GE 130 - 139MM HG: CPT | Performed by: NURSE PRACTITIONER

## 2023-06-22 PROCEDURE — G8427 DOCREV CUR MEDS BY ELIG CLIN: HCPCS | Performed by: NURSE PRACTITIONER

## 2023-06-22 PROCEDURE — 1123F ACP DISCUSS/DSCN MKR DOCD: CPT | Performed by: NURSE PRACTITIONER

## 2023-06-22 PROCEDURE — G8417 CALC BMI ABV UP PARAM F/U: HCPCS | Performed by: NURSE PRACTITIONER

## 2023-06-22 RX ORDER — HYDROCHLOROTHIAZIDE 25 MG/1
25 TABLET ORAL DAILY
Qty: 90 TABLET | Refills: 1 | Status: SHIPPED | OUTPATIENT
Start: 2023-06-22

## 2023-06-22 RX ORDER — OMEPRAZOLE 40 MG/1
40 CAPSULE, DELAYED RELEASE ORAL DAILY
Qty: 90 CAPSULE | Refills: 1 | Status: SHIPPED | OUTPATIENT
Start: 2023-06-22

## 2023-06-22 RX ORDER — LISINOPRIL 40 MG/1
40 TABLET ORAL DAILY
Qty: 90 TABLET | Refills: 1 | Status: SHIPPED | OUTPATIENT
Start: 2023-06-22

## 2023-06-22 SDOH — ECONOMIC STABILITY: FOOD INSECURITY: WITHIN THE PAST 12 MONTHS, YOU WORRIED THAT YOUR FOOD WOULD RUN OUT BEFORE YOU GOT MONEY TO BUY MORE.: NEVER TRUE

## 2023-06-22 SDOH — ECONOMIC STABILITY: INCOME INSECURITY: HOW HARD IS IT FOR YOU TO PAY FOR THE VERY BASICS LIKE FOOD, HOUSING, MEDICAL CARE, AND HEATING?: NOT HARD AT ALL

## 2023-06-22 SDOH — ECONOMIC STABILITY: HOUSING INSECURITY
IN THE LAST 12 MONTHS, WAS THERE A TIME WHEN YOU DID NOT HAVE A STEADY PLACE TO SLEEP OR SLEPT IN A SHELTER (INCLUDING NOW)?: NO

## 2023-06-22 SDOH — ECONOMIC STABILITY: FOOD INSECURITY: WITHIN THE PAST 12 MONTHS, THE FOOD YOU BOUGHT JUST DIDN'T LAST AND YOU DIDN'T HAVE MONEY TO GET MORE.: NEVER TRUE

## 2023-06-22 ASSESSMENT — PATIENT HEALTH QUESTIONNAIRE - PHQ9
SUM OF ALL RESPONSES TO PHQ9 QUESTIONS 1 & 2: 1
SUM OF ALL RESPONSES TO PHQ QUESTIONS 1-9: 1
2. FEELING DOWN, DEPRESSED OR HOPELESS: 1
1. LITTLE INTEREST OR PLEASURE IN DOING THINGS: 0
SUM OF ALL RESPONSES TO PHQ QUESTIONS 1-9: 1

## 2023-06-22 NOTE — PROGRESS NOTES
Room 7    When asked if patient has any concerns he would like to address with JAIRON Thomas patient states no . Did patient bring someone? No     Did the patient have DME equipment? No     Did you take your medication today? Yes       1. \"Have you been to the ER, urgent care clinic since your last visit? Hospitalized since your last visit? \" No     2. \"Have you seen or consulted any other health care providers outside of the [address] [place] [address] since your last visit? \" No     3. For patients aged 39-70: Has the patient had a colonoscopy / FIT/ Cologuard? Yes      If the patient is female:    4. For patients aged 41-77: Has the patient had a mammogram within the past 2 years? N/A      5. For patients aged 21-65: Has the patient had a pap smear? {Cancer Care Gap present?  N/A      PHQ-9  6/22/2023   Little interest or pleasure in doing things 0   Little interest or pleasure in doing things -   Feeling down, depressed, or hopeless 1   PHQ-2 Score 1   Total Score PHQ 2 -   PHQ-9 Total Score 1          Health Maintenance Due   Topic Date Due    Shingles vaccine (1 of 2) Never done    AAA screen  Never done    COVID-19 Vaccine (3 - Booster for Mejia Peter series) 06/08/2021

## 2023-06-22 NOTE — PROGRESS NOTES
[place]               [address]               [phone]      Marcos Simmons is a 67 y.o. male and presents with Follow-up Chronic Condition and Hypertension       Assessment/Plan:    1. Primary hypertension  -     lisinopril (PRINIVIL;ZESTRIL) 40 MG tablet; Take 1 tablet by mouth daily, Disp-90 tablet, R-1Normal  -     hydroCHLOROthiazide (HYDRODIURIL) 25 MG tablet; Take 1 tablet by mouth daily, Disp-90 tablet, R-1Normal  -     Comprehensive Metabolic Panel; Future  -     Lipid Panel; Future  Endorses medication compliance, Refill provided, Follow up labs prior to next visit, and Blood pressure stable, continue lisinopril and hctz at same dose  2. Gastroesophageal reflux disease, unspecified whether esophagitis present  -     omeprazole (PRILOSEC) 40 MG delayed release capsule; Take 1 capsule by mouth daily, Disp-90 capsule, R-1Normal   Refill provided      Follow up and disposition:   Return in about 4 months (around 10/22/2023) for MAWV, HTN, 30min, office, w/labsprior. Subjective:    Labs obtained prior to visit? Yes  Reviewed with patient? yes    Hypertension:  /61 Comment: feet flat on floo r  Pulse 63   Temp 98 °F (36.7 °C) (Temporal)   Resp 18   Ht 5' 2\" (1.575 m)   Wt 189 lb (85.7 kg)   SpO2 96%   BMI 34.57 kg/m²    Patient reports taking medications as instructed. Yes   Medication side effects noted no  Headache upon wakening. no   Home BP monitoring: yes, 131/60-70  Do you experience chest pain/pressure or SOB with exertion? no  Maintain a low Sodium diet?  Yes  Lab Results   Component Value Date/Time    BUN 22 06/15/2023 08:36 AM    CREATININE 1.40 06/15/2023 08:36 AM    LABGLOM 53 06/15/2023 08:36 AM    K 4.3 06/15/2023 08:36 AM     Key Anti-Hypertensive Meds            hydroCHLOROthiazide (HYDRODIURIL) 25 MG tablet (Taking)    Class: Historical Med    lisinopril (PRINIVIL;ZESTRIL) 40 MG tablet (Taking)    Class: Historical Med             ROS:

## 2023-10-17 ENCOUNTER — HOSPITAL ENCOUNTER (OUTPATIENT)
Facility: HOSPITAL | Age: 73
Setting detail: SPECIMEN
Discharge: HOME | End: 2023-10-20
Payer: MEDICARE

## 2023-10-17 LAB
CHOLEST SERPL-MCNC: 164 MG/DL
HDLC SERPL-MCNC: 40 MG/DL (ref 40–60)
HDLC SERPL: 4.1 (ref 0–5)
LDLC SERPL CALC-MCNC: 97 MG/DL (ref 0–100)
LIPID PANEL: NORMAL
TRIGL SERPL-MCNC: 135 MG/DL
VLDLC SERPL CALC-MCNC: 27 MG/DL

## 2023-10-17 PROCEDURE — 80061 LIPID PANEL: CPT

## 2023-10-23 SDOH — HEALTH STABILITY: PHYSICAL HEALTH: ON AVERAGE, HOW MANY DAYS PER WEEK DO YOU ENGAGE IN MODERATE TO STRENUOUS EXERCISE (LIKE A BRISK WALK)?: 7 DAYS

## 2023-10-23 ASSESSMENT — PATIENT HEALTH QUESTIONNAIRE - PHQ9
SUM OF ALL RESPONSES TO PHQ9 QUESTIONS 1 & 2: 0
SUM OF ALL RESPONSES TO PHQ QUESTIONS 1-9: 0
2. FEELING DOWN, DEPRESSED OR HOPELESS: 0
1. LITTLE INTEREST OR PLEASURE IN DOING THINGS: 0

## 2023-10-23 ASSESSMENT — LIFESTYLE VARIABLES
HOW OFTEN DO YOU HAVE A DRINK CONTAINING ALCOHOL: 4
HOW MANY STANDARD DRINKS CONTAINING ALCOHOL DO YOU HAVE ON A TYPICAL DAY: 1
HOW OFTEN DO YOU HAVE SIX OR MORE DRINKS ON ONE OCCASION: 1
HOW MANY STANDARD DRINKS CONTAINING ALCOHOL DO YOU HAVE ON A TYPICAL DAY: 1 OR 2
HOW OFTEN DO YOU HAVE A DRINK CONTAINING ALCOHOL: 2-3 TIMES A WEEK

## 2023-10-24 ENCOUNTER — OFFICE VISIT (OUTPATIENT)
Facility: CLINIC | Age: 73
End: 2023-10-24
Payer: MEDICARE

## 2023-10-24 ENCOUNTER — HOSPITAL ENCOUNTER (OUTPATIENT)
Facility: HOSPITAL | Age: 73
Setting detail: SPECIMEN
Discharge: HOME | End: 2023-10-27
Payer: MEDICARE

## 2023-10-24 VITALS
BODY MASS INDEX: 34.96 KG/M2 | HEART RATE: 50 BPM | SYSTOLIC BLOOD PRESSURE: 162 MMHG | TEMPERATURE: 98.5 F | OXYGEN SATURATION: 97 % | WEIGHT: 190 LBS | RESPIRATION RATE: 20 BRPM | DIASTOLIC BLOOD PRESSURE: 80 MMHG | HEIGHT: 62 IN

## 2023-10-24 DIAGNOSIS — I10 PRIMARY HYPERTENSION: ICD-10-CM

## 2023-10-24 DIAGNOSIS — Z71.89 ADVANCED CARE PLANNING/COUNSELING DISCUSSION: ICD-10-CM

## 2023-10-24 DIAGNOSIS — Z00.00 MEDICARE ANNUAL WELLNESS VISIT, SUBSEQUENT: Primary | ICD-10-CM

## 2023-10-24 DIAGNOSIS — K21.9 GASTROESOPHAGEAL REFLUX DISEASE, UNSPECIFIED WHETHER ESOPHAGITIS PRESENT: ICD-10-CM

## 2023-10-24 LAB
ALBUMIN SERPL-MCNC: 3.8 G/DL (ref 3.4–5)
ALBUMIN/GLOB SERPL: 1.2 (ref 0.8–1.7)
ALP SERPL-CCNC: 79 U/L (ref 45–117)
ALT SERPL-CCNC: 60 U/L (ref 16–61)
ANION GAP SERPL CALC-SCNC: 5 MMOL/L (ref 3–18)
AST SERPL-CCNC: 35 U/L (ref 10–38)
BILIRUB SERPL-MCNC: 0.6 MG/DL (ref 0.2–1)
BUN SERPL-MCNC: 23 MG/DL (ref 7–18)
BUN/CREAT SERPL: 17 (ref 12–20)
CALCIUM SERPL-MCNC: 8.6 MG/DL (ref 8.5–10.1)
CHLORIDE SERPL-SCNC: 108 MMOL/L (ref 100–111)
CO2 SERPL-SCNC: 27 MMOL/L (ref 21–32)
CREAT SERPL-MCNC: 1.35 MG/DL (ref 0.6–1.3)
GLOBULIN SER CALC-MCNC: 3.3 G/DL (ref 2–4)
GLUCOSE SERPL-MCNC: 105 MG/DL (ref 74–99)
POTASSIUM SERPL-SCNC: 4 MMOL/L (ref 3.5–5.5)
PROT SERPL-MCNC: 7.1 G/DL (ref 6.4–8.2)
SODIUM SERPL-SCNC: 140 MMOL/L (ref 136–145)

## 2023-10-24 PROCEDURE — 3017F COLORECTAL CA SCREEN DOC REV: CPT | Performed by: NURSE PRACTITIONER

## 2023-10-24 PROCEDURE — 1123F ACP DISCUSS/DSCN MKR DOCD: CPT | Performed by: NURSE PRACTITIONER

## 2023-10-24 PROCEDURE — 36415 COLL VENOUS BLD VENIPUNCTURE: CPT

## 2023-10-24 PROCEDURE — G0439 PPPS, SUBSEQ VISIT: HCPCS | Performed by: NURSE PRACTITIONER

## 2023-10-24 PROCEDURE — G8484 FLU IMMUNIZE NO ADMIN: HCPCS | Performed by: NURSE PRACTITIONER

## 2023-10-24 PROCEDURE — 3079F DIAST BP 80-89 MM HG: CPT | Performed by: NURSE PRACTITIONER

## 2023-10-24 PROCEDURE — 80053 COMPREHEN METABOLIC PANEL: CPT

## 2023-10-24 PROCEDURE — 3077F SYST BP >= 140 MM HG: CPT | Performed by: NURSE PRACTITIONER

## 2023-10-24 RX ORDER — LISINOPRIL 40 MG/1
40 TABLET ORAL DAILY
Qty: 90 TABLET | Refills: 1 | Status: SHIPPED | OUTPATIENT
Start: 2023-10-24

## 2023-10-24 RX ORDER — HYDROCHLOROTHIAZIDE 25 MG/1
25 TABLET ORAL DAILY
Qty: 90 TABLET | Refills: 1 | Status: SHIPPED | OUTPATIENT
Start: 2023-10-24

## 2023-10-24 RX ORDER — OMEPRAZOLE 40 MG/1
40 CAPSULE, DELAYED RELEASE ORAL DAILY
Qty: 90 CAPSULE | Refills: 1 | Status: SHIPPED | OUTPATIENT
Start: 2023-10-24

## 2023-10-24 SDOH — ECONOMIC STABILITY: FOOD INSECURITY: WITHIN THE PAST 12 MONTHS, THE FOOD YOU BOUGHT JUST DIDN'T LAST AND YOU DIDN'T HAVE MONEY TO GET MORE.: NEVER TRUE

## 2023-10-24 SDOH — ECONOMIC STABILITY: INCOME INSECURITY: HOW HARD IS IT FOR YOU TO PAY FOR THE VERY BASICS LIKE FOOD, HOUSING, MEDICAL CARE, AND HEATING?: NOT HARD AT ALL

## 2023-10-24 SDOH — ECONOMIC STABILITY: FOOD INSECURITY: WITHIN THE PAST 12 MONTHS, YOU WORRIED THAT YOUR FOOD WOULD RUN OUT BEFORE YOU GOT MONEY TO BUY MORE.: NEVER TRUE

## 2023-10-24 ASSESSMENT — ANXIETY QUESTIONNAIRES
1. FEELING NERVOUS, ANXIOUS, OR ON EDGE: 0
7. FEELING AFRAID AS IF SOMETHING AWFUL MIGHT HAPPEN: 0
6. BECOMING EASILY ANNOYED OR IRRITABLE: 0
3. WORRYING TOO MUCH ABOUT DIFFERENT THINGS: 0
GAD7 TOTAL SCORE: 0
5. BEING SO RESTLESS THAT IT IS HARD TO SIT STILL: 0
IF YOU CHECKED OFF ANY PROBLEMS ON THIS QUESTIONNAIRE, HOW DIFFICULT HAVE THESE PROBLEMS MADE IT FOR YOU TO DO YOUR WORK, TAKE CARE OF THINGS AT HOME, OR GET ALONG WITH OTHER PEOPLE: NOT DIFFICULT AT ALL
2. NOT BEING ABLE TO STOP OR CONTROL WORRYING: 0
4. TROUBLE RELAXING: 0

## 2023-10-24 ASSESSMENT — VISUAL ACUITY
OS_CC: 20/25
OD_CC: 20/25

## 2023-10-24 NOTE — ACP (ADVANCE CARE PLANNING)
Advance Care Planning     General Advance Care Planning (ACP) Conversation    Date of Conversation: 10/24/2023  Conducted with: Patient with [address] Decision Maker:    Primary Decision Maker: Surekha [facility] - [phone]  Click here to complete Healthcare Decision Makers including selection of the Healthcare Decision Maker Relationship (ie \"Primary\"). Today we documented desired Decision Maker(s), who is (are) NOT Legal Next of Kin. ACP documents are required for decision maker authority.     Content/Action Overview:  Has NO ACP documents/care preferences - information provided, considering goals and options  Reviewed DNR/DNI and patient elects Full Code (Attempt Resuscitation)  resuscitation preferences      Length of Voluntary ACP Conversation in minutes:  <16 minutes (Non-Billable)    Hanny Batista, APRN - CNP

## 2023-10-24 NOTE — PATIENT INSTRUCTIONS
Learning About Vision Tests  What are vision tests? The four most common vision tests are visual acuity tests, refraction, visual field tests, and color vision tests. Visual acuity (sharpness) tests  These tests are used: To see if you need glasses or contact lenses. To monitor an eye problem. To check an eye injury. Visual acuity tests are done as part of routine exams. You may also have this test when you get your 's license or apply for some types of jobs. Visual field tests  These tests are used: To check for vision loss in any area of your range of vision. To screen for certain eye diseases. To look for nerve damage after a stroke, head injury, or other problem that could reduce blood flow to the brain. Refraction and color tests  A refraction test is done to find the right prescription for glasses and contact lenses. A color vision test is done to check for color blindness. Color vision is often tested as part of a routine exam. You may also have this test when you apply for a job where recognizing different colors is important, such as , electronics, or the [employer]. How are vision tests done? Visual acuity test   You cover one eye at a time. You read aloud from a wall chart across the room. You read aloud from a small card that you hold in your hand. Refraction   You look into a special device. The device puts lenses of different strengths in front of each eye to see how strong your glasses or contact lenses need to be. Visual field tests   Your doctor may have you look through special machines. Or your doctor may simply have you stare straight ahead while they move a finger into and out of your field of vision. Color vision test   You look at pieces of printed test patterns in various colors. You say what number or symbol you see. Your doctor may have you trace the number or symbol using a pointer. How do these tests feel?   There is very little chance of

## 2023-11-06 ENCOUNTER — NURSE ONLY (OUTPATIENT)
Facility: CLINIC | Age: 73
End: 2023-11-06

## 2023-11-06 VITALS
SYSTOLIC BLOOD PRESSURE: 154 MMHG | TEMPERATURE: 98.5 F | HEIGHT: 62 IN | DIASTOLIC BLOOD PRESSURE: 79 MMHG | HEART RATE: 55 BPM | OXYGEN SATURATION: 96 % | WEIGHT: 191 LBS | BODY MASS INDEX: 35.15 KG/M2 | RESPIRATION RATE: 20 BRPM

## 2023-11-06 DIAGNOSIS — I10 PRIMARY HYPERTENSION: Primary | ICD-10-CM

## 2023-11-06 RX ORDER — AMLODIPINE BESYLATE 5 MG/1
5 TABLET ORAL DAILY
Qty: 30 TABLET | Refills: 3 | Status: SHIPPED | OUTPATIENT
Start: 2023-11-06

## 2023-11-06 SDOH — ECONOMIC STABILITY: FOOD INSECURITY: WITHIN THE PAST 12 MONTHS, YOU WORRIED THAT YOUR FOOD WOULD RUN OUT BEFORE YOU GOT MONEY TO BUY MORE.: NEVER TRUE

## 2023-11-06 SDOH — ECONOMIC STABILITY: FOOD INSECURITY: WITHIN THE PAST 12 MONTHS, THE FOOD YOU BOUGHT JUST DIDN'T LAST AND YOU DIDN'T HAVE MONEY TO GET MORE.: NEVER TRUE

## 2023-11-06 SDOH — ECONOMIC STABILITY: INCOME INSECURITY: HOW HARD IS IT FOR YOU TO PAY FOR THE VERY BASICS LIKE FOOD, HOUSING, MEDICAL CARE, AND HEATING?: NOT HARD AT ALL

## 2023-11-06 ASSESSMENT — ANXIETY QUESTIONNAIRES
4. TROUBLE RELAXING: 0
5. BEING SO RESTLESS THAT IT IS HARD TO SIT STILL: 0
GAD7 TOTAL SCORE: 0
6. BECOMING EASILY ANNOYED OR IRRITABLE: 0
3. WORRYING TOO MUCH ABOUT DIFFERENT THINGS: 0
2. NOT BEING ABLE TO STOP OR CONTROL WORRYING: 0
7. FEELING AFRAID AS IF SOMETHING AWFUL MIGHT HAPPEN: 0
1. FEELING NERVOUS, ANXIOUS, OR ON EDGE: 0
IF YOU CHECKED OFF ANY PROBLEMS ON THIS QUESTIONNAIRE, HOW DIFFICULT HAVE THESE PROBLEMS MADE IT FOR YOU TO DO YOUR WORK, TAKE CARE OF THINGS AT HOME, OR GET ALONG WITH OTHER PEOPLE: NOT DIFFICULT AT ALL

## 2023-11-06 ASSESSMENT — PATIENT HEALTH QUESTIONNAIRE - PHQ9
2. FEELING DOWN, DEPRESSED OR HOPELESS: 0
SUM OF ALL RESPONSES TO PHQ QUESTIONS 1-9: 0
SUM OF ALL RESPONSES TO PHQ9 QUESTIONS 1 & 2: 0
SUM OF ALL RESPONSES TO PHQ QUESTIONS 1-9: 0
SUM OF ALL RESPONSES TO PHQ QUESTIONS 1-9: 0
1. LITTLE INTEREST OR PLEASURE IN DOING THINGS: 0
SUM OF ALL RESPONSES TO PHQ QUESTIONS 1-9: 0

## 2023-11-06 NOTE — PROGRESS NOTES
Reviewed labs  Blood pressure still elevated, add amlodipine 5mg  Follow up in 2 weeks for blood pressure check

## 2023-11-20 ENCOUNTER — NURSE ONLY (OUTPATIENT)
Facility: CLINIC | Age: 73
End: 2023-11-20

## 2023-11-20 VITALS
OXYGEN SATURATION: 99 % | SYSTOLIC BLOOD PRESSURE: 139 MMHG | DIASTOLIC BLOOD PRESSURE: 67 MMHG | TEMPERATURE: 98 F | WEIGHT: 191 LBS | HEIGHT: 62 IN | BODY MASS INDEX: 35.15 KG/M2 | HEART RATE: 67 BPM | RESPIRATION RATE: 16 BRPM

## 2023-11-20 NOTE — PROGRESS NOTES
Per NP Dot Roles instructions patient presents today for a blood pressure check. Patient seated and resting for 15 minutes with both feet flat on the floor. Blood pressure taken and documented. Reported blood pressure to NP Dot Roles.

## 2024-02-08 ENCOUNTER — OFFICE VISIT (OUTPATIENT)
Facility: CLINIC | Age: 74
End: 2024-02-08

## 2024-02-08 ENCOUNTER — HOSPITAL ENCOUNTER (OUTPATIENT)
Facility: HOSPITAL | Age: 74
Setting detail: SPECIMEN
Discharge: HOME | End: 2024-02-08
Payer: MEDICARE

## 2024-02-08 VITALS
HEART RATE: 76 BPM | OXYGEN SATURATION: 97 % | BODY MASS INDEX: 34.96 KG/M2 | HEIGHT: 62 IN | TEMPERATURE: 98 F | SYSTOLIC BLOOD PRESSURE: 180 MMHG | WEIGHT: 190 LBS | DIASTOLIC BLOOD PRESSURE: 70 MMHG | RESPIRATION RATE: 18 BRPM

## 2024-02-08 DIAGNOSIS — H81.13 BENIGN PAROXYSMAL POSITIONAL VERTIGO DUE TO BILATERAL VESTIBULAR DISORDER: ICD-10-CM

## 2024-02-08 DIAGNOSIS — N18.31 CHRONIC KIDNEY DISEASE, STAGE 3A (HCC): ICD-10-CM

## 2024-02-08 DIAGNOSIS — R42 VERTIGO: Primary | ICD-10-CM

## 2024-02-08 DIAGNOSIS — I10 PRIMARY HYPERTENSION: ICD-10-CM

## 2024-02-08 LAB
ALBUMIN SERPL-MCNC: 3.7 G/DL (ref 3.4–5)
ALBUMIN/GLOB SERPL: 1 (ref 0.8–1.7)
ALP SERPL-CCNC: 103 U/L (ref 45–117)
ALT SERPL-CCNC: 82 U/L (ref 16–61)
ANION GAP SERPL CALC-SCNC: 3 MMOL/L (ref 3–18)
AST SERPL-CCNC: 39 U/L (ref 10–38)
BILIRUB SERPL-MCNC: 0.4 MG/DL (ref 0.2–1)
BUN SERPL-MCNC: 31 MG/DL (ref 7–18)
BUN/CREAT SERPL: 22 (ref 12–20)
CALCIUM SERPL-MCNC: 9 MG/DL (ref 8.5–10.1)
CHLORIDE SERPL-SCNC: 113 MMOL/L (ref 100–111)
CHOLEST SERPL-MCNC: 169 MG/DL
CO2 SERPL-SCNC: 25 MMOL/L (ref 21–32)
CREAT SERPL-MCNC: 1.41 MG/DL (ref 0.6–1.3)
GLOBULIN SER CALC-MCNC: 3.6 G/DL (ref 2–4)
GLUCOSE SERPL-MCNC: 99 MG/DL (ref 74–99)
HDLC SERPL-MCNC: 39 MG/DL (ref 40–60)
HDLC SERPL: 4.3 (ref 0–5)
LDLC SERPL CALC-MCNC: 87.6 MG/DL (ref 0–100)
LIPID PANEL: ABNORMAL
POTASSIUM SERPL-SCNC: 4.4 MMOL/L (ref 3.5–5.5)
PROT SERPL-MCNC: 7.3 G/DL (ref 6.4–8.2)
SODIUM SERPL-SCNC: 141 MMOL/L (ref 136–145)
TRIGL SERPL-MCNC: 212 MG/DL
VLDLC SERPL CALC-MCNC: 42.4 MG/DL

## 2024-02-08 PROCEDURE — 36415 COLL VENOUS BLD VENIPUNCTURE: CPT

## 2024-02-08 PROCEDURE — 80053 COMPREHEN METABOLIC PANEL: CPT

## 2024-02-08 PROCEDURE — 80061 LIPID PANEL: CPT

## 2024-02-08 RX ORDER — MECLIZINE HCL 12.5 MG/1
12.5 TABLET ORAL EVERY 8 HOURS PRN
Qty: 30 TABLET | Refills: 1 | Status: SHIPPED | OUTPATIENT
Start: 2024-02-08

## 2024-02-08 RX ORDER — MELOXICAM 15 MG/1
TABLET ORAL
COMMUNITY
Start: 2024-01-17

## 2024-02-08 RX ORDER — METHOCARBAMOL 500 MG/1
TABLET, FILM COATED ORAL
COMMUNITY
Start: 2024-01-07

## 2024-02-08 RX ORDER — DOXYCYCLINE 100 MG/1
CAPSULE ORAL
COMMUNITY
Start: 2024-02-03

## 2024-02-08 RX ORDER — GUAIFENESIN 600 MG/1
600 TABLET, EXTENDED RELEASE ORAL EVERY 12 HOURS
COMMUNITY
Start: 2024-02-03

## 2024-02-08 RX ORDER — LIDOCAINE 50 MG/G
PATCH TOPICAL
COMMUNITY
Start: 2024-01-07

## 2024-02-08 RX ORDER — MECLIZINE HCL 12.5 MG/1
12.5 TABLET ORAL EVERY 8 HOURS PRN
COMMUNITY
Start: 2024-02-03 | End: 2024-02-08 | Stop reason: SDUPTHER

## 2024-02-08 RX ORDER — FLUTICASONE PROPIONATE 50 MCG
SPRAY, SUSPENSION (ML) NASAL
COMMUNITY
Start: 2024-02-03

## 2024-02-08 RX ORDER — AMOXICILLIN AND CLAVULANATE POTASSIUM 875; 125 MG/1; MG/1
TABLET, FILM COATED ORAL
COMMUNITY
Start: 2024-02-03

## 2024-02-08 NOTE — PROGRESS NOTES
\"Have you been to the ER, urgent care clinic since your last visit?  Hospitalized since your last visit?\"    YES - When: approximately 6 days ago.  Where and Why: 2024 [facility] for Vertigo.    “Have you seen or consulted any other health care providers outside of [facility] since your last visit?”    NO    William Urenaman  presents today for   Chief Complaint   Patient presents with    Follow-up     Lissy Silver        Is someone accompanying this pt? No    Is the patient using any DME equipment during OV? No    Depression Screenin/8/2024     2:43 PM 2023     9:07 AM 10/23/2023     1:44 PM 2023     9:13 AM 2023     9:00 AM 10/6/2022     2:00 PM 2022    10:00 AM   PHQ-9 Questionaire   Little interest or pleasure in doing things 0 0 0 0 0 0 0   Feeling down, depressed, or hopeless 0 0 0 1 0 0 0   PHQ-9 Total Score 0 0 0 1 0 0 0         2024     2:43 PM 2023     9:07 AM 10/23/2023     1:44 PM 2023     9:13 AM 2023     9:00 AM 10/6/2022     2:00 PM 2022    10:00 AM   PHQ Scores   PHQ2 Score 0 0 0 1 0 0 0   PHQ2 Score       0   PHQ9 Score 0 0 0 1 0 0 0       Learning Assessment:  No question data found.    Abuse Screening:       No data to display                Fall Risk       No data to display                OPIOID RISK TOOL       No data to display                        
capsule 1     No current facility-administered medications on file prior to visit.        No Known Allergies    Objective:  BP (!) 175/75   Pulse 76   Temp 98 °F (36.7 °C)   Resp 18   Ht 1.575 m (5' 2\")   Wt 86.2 kg (190 lb)   SpO2 97%   BMI 34.75 kg/m²  Body mass index is 34.75 kg/m².    Physical assessment  Physical Exam  Vitals and nursing note reviewed.   Constitutional:       Appearance: Normal appearance.   HENT:      Head: Normocephalic and atraumatic.   Eyes:      Pupils: Pupils are equal, round, and reactive to light.   Cardiovascular:      Rate and Rhythm: Normal rate and regular rhythm.      Heart sounds: Normal heart sounds. No murmur heard.     No friction rub. No gallop.   Pulmonary:      Effort: Pulmonary effort is normal. No respiratory distress.      Breath sounds: Examination of the right-upper field reveals rhonchi. Examination of the left-upper field reveals rhonchi. Examination of the right-middle field reveals rhonchi. Examination of the left-middle field reveals rhonchi. Examination of the right-lower field reveals rhonchi. Examination of the left-lower field reveals rhonchi. Rhonchi present.      Comments: Moist non productive cough  Musculoskeletal:      Comments: Needs assistance with ambulation due to the vertigo   Neurological:      Mental Status: He is alert and oriented to person, place, and time.   Psychiatric:         Mood and Affect: Mood normal.         Behavior: Behavior normal.         Thought Content: Thought content normal.         Judgment: Judgment normal.                 I have discussed the diagnosis with the patient and the intended plan as seen in the above orders.  The patient has received an After-Visit Summary and questions were answered concerning future plans.     An After Visit Summary was printed and given to the patient.    All diagnosis have been discussed with the patient and all of the patient's questions have been answered.           Francine Oviedo,

## 2024-02-14 ENCOUNTER — TELEPHONE (OUTPATIENT)
Facility: CLINIC | Age: 74
End: 2024-02-14

## 2024-02-14 DIAGNOSIS — H81.13 BENIGN PAROXYSMAL POSITIONAL VERTIGO DUE TO BILATERAL VESTIBULAR DISORDER: Primary | ICD-10-CM

## 2024-02-14 NOTE — TELEPHONE ENCOUNTER
[facility] needs  scripts for patient William Jolly Physical therapy. Fax to Xiomara at [phone]

## 2024-02-24 DIAGNOSIS — I10 PRIMARY HYPERTENSION: ICD-10-CM

## 2024-02-27 RX ORDER — AMLODIPINE BESYLATE 5 MG/1
5 TABLET ORAL DAILY
Qty: 30 TABLET | Refills: 3 | Status: SHIPPED | OUTPATIENT
Start: 2024-02-27 | End: 2024-02-29 | Stop reason: SDUPTHER

## 2024-02-29 ENCOUNTER — OFFICE VISIT (OUTPATIENT)
Facility: CLINIC | Age: 74
End: 2024-02-29
Payer: MEDICARE

## 2024-02-29 VITALS
SYSTOLIC BLOOD PRESSURE: 135 MMHG | HEART RATE: 63 BPM | BODY MASS INDEX: 34.68 KG/M2 | OXYGEN SATURATION: 96 % | WEIGHT: 189.6 LBS | DIASTOLIC BLOOD PRESSURE: 76 MMHG

## 2024-02-29 DIAGNOSIS — H81.13 BENIGN PAROXYSMAL POSITIONAL VERTIGO DUE TO BILATERAL VESTIBULAR DISORDER: ICD-10-CM

## 2024-02-29 DIAGNOSIS — R42 VERTIGO: ICD-10-CM

## 2024-02-29 DIAGNOSIS — N18.31 STAGE 3A CHRONIC KIDNEY DISEASE (HCC): ICD-10-CM

## 2024-02-29 DIAGNOSIS — I10 PRIMARY HYPERTENSION: Primary | ICD-10-CM

## 2024-02-29 DIAGNOSIS — K21.9 GASTROESOPHAGEAL REFLUX DISEASE, UNSPECIFIED WHETHER ESOPHAGITIS PRESENT: ICD-10-CM

## 2024-02-29 PROCEDURE — G8484 FLU IMMUNIZE NO ADMIN: HCPCS | Performed by: NURSE PRACTITIONER

## 2024-02-29 PROCEDURE — 3075F SYST BP GE 130 - 139MM HG: CPT | Performed by: NURSE PRACTITIONER

## 2024-02-29 PROCEDURE — 3078F DIAST BP <80 MM HG: CPT | Performed by: NURSE PRACTITIONER

## 2024-02-29 PROCEDURE — 1123F ACP DISCUSS/DSCN MKR DOCD: CPT | Performed by: NURSE PRACTITIONER

## 2024-02-29 PROCEDURE — 99214 OFFICE O/P EST MOD 30 MIN: CPT | Performed by: NURSE PRACTITIONER

## 2024-02-29 PROCEDURE — 1036F TOBACCO NON-USER: CPT | Performed by: NURSE PRACTITIONER

## 2024-02-29 PROCEDURE — G8427 DOCREV CUR MEDS BY ELIG CLIN: HCPCS | Performed by: NURSE PRACTITIONER

## 2024-02-29 PROCEDURE — G8417 CALC BMI ABV UP PARAM F/U: HCPCS | Performed by: NURSE PRACTITIONER

## 2024-02-29 PROCEDURE — 3017F COLORECTAL CA SCREEN DOC REV: CPT | Performed by: NURSE PRACTITIONER

## 2024-02-29 RX ORDER — LISINOPRIL 40 MG/1
40 TABLET ORAL DAILY
Qty: 90 TABLET | Refills: 1 | Status: SHIPPED | OUTPATIENT
Start: 2024-02-29

## 2024-02-29 RX ORDER — AMLODIPINE BESYLATE 5 MG/1
5 TABLET ORAL DAILY
Qty: 90 TABLET | Refills: 1 | Status: SHIPPED | OUTPATIENT
Start: 2024-02-29

## 2024-02-29 RX ORDER — OMEPRAZOLE 40 MG/1
40 CAPSULE, DELAYED RELEASE ORAL DAILY
Qty: 90 CAPSULE | Refills: 1 | Status: SHIPPED | OUTPATIENT
Start: 2024-02-29

## 2024-02-29 RX ORDER — HYDROCHLOROTHIAZIDE 25 MG/1
25 TABLET ORAL DAILY
Qty: 90 TABLET | Refills: 1 | Status: SHIPPED | OUTPATIENT
Start: 2024-02-29

## 2024-02-29 RX ORDER — MECLIZINE HCL 12.5 MG/1
12.5 TABLET ORAL EVERY 8 HOURS PRN
Qty: 30 TABLET | Refills: 1 | Status: SHIPPED | OUTPATIENT
Start: 2024-02-29

## 2024-02-29 SDOH — ECONOMIC STABILITY: INCOME INSECURITY: HOW HARD IS IT FOR YOU TO PAY FOR THE VERY BASICS LIKE FOOD, HOUSING, MEDICAL CARE, AND HEATING?: NOT HARD AT ALL

## 2024-02-29 SDOH — ECONOMIC STABILITY: FOOD INSECURITY: WITHIN THE PAST 12 MONTHS, THE FOOD YOU BOUGHT JUST DIDN'T LAST AND YOU DIDN'T HAVE MONEY TO GET MORE.: NEVER TRUE

## 2024-02-29 SDOH — ECONOMIC STABILITY: FOOD INSECURITY: WITHIN THE PAST 12 MONTHS, YOU WORRIED THAT YOUR FOOD WOULD RUN OUT BEFORE YOU GOT MONEY TO BUY MORE.: NEVER TRUE

## 2024-02-29 NOTE — PROGRESS NOTES
[address] [place] [address]               [phone]      William Jolly Jr is a 73 y.o. male and presents with No chief complaint on file.       Assessment/Plan:    1. Primary hypertension  -     amLODIPine (NORVASC) 5 MG tablet; Take 1 tablet by mouth daily, Disp-90 tablet, R-1Normal  -     hydroCHLOROthiazide (HYDRODIURIL) 25 MG tablet; Take 1 tablet by mouth daily, Disp-90 tablet, R-1Normal  -     lisinopril (PRINIVIL;ZESTRIL) 40 MG tablet; Take 1 tablet by mouth daily, Disp-90 tablet, R-1Normal  -     Comprehensive Metabolic Panel; Future  -     Lipid Panel; Future  -     CBC; Future  2. Vertigo  -     meclizine (ANTIVERT) 12.5 MG tablet; Take 1 tablet by mouth every 8 hours as needed for Dizziness, Disp-30 tablet, R-1Normal  3. Benign paroxysmal positional vertigo due to bilateral vestibular disorder  -     meclizine (ANTIVERT) 12.5 MG tablet; Take 1 tablet by mouth every 8 hours as needed for Dizziness, Disp-30 tablet, R-1Normal  4. Gastroesophageal reflux disease, unspecified whether esophagitis present  -     omeprazole (PRILOSEC) 40 MG delayed release capsule; Take 1 capsule by mouth daily, Disp-90 capsule, R-1Normal  5. Stage 3a chronic kidney disease (HCC)  -     Microalbumin / Creatinine Urine Ratio; Future     Endorses medication compliance, Refill provided, Follow up labs prior to next visit, and Blood pressure stable, continue hctz, lisinopril and amlodipine at same dose    He has appointment with [facility] cardiology to follow up on echo completed 2/2/24      Follow up and disposition:   Return in about 4 months (around 6/29/2024) for HTN, 15min, office, w/labsprior.      Subjective:    Labs obtained prior to visit? Yes  Reviewed with patient? yes    Hypertension:  Wt 86 kg (189 lb 9.6 oz)   BMI 34.68 kg/m²    Patient reports taking medications as instructed. Yes   Medication side effects noted no  Headache upon wakening.no   Home BP monitoring: yes, 150/72  Do

## 2024-04-24 DIAGNOSIS — I10 PRIMARY HYPERTENSION: ICD-10-CM

## 2024-04-24 RX ORDER — LISINOPRIL 40 MG/1
40 TABLET ORAL DAILY
Qty: 90 TABLET | Refills: 1 | Status: SHIPPED | OUTPATIENT
Start: 2024-04-24

## 2024-04-27 DIAGNOSIS — I10 PRIMARY HYPERTENSION: ICD-10-CM

## 2024-04-29 RX ORDER — HYDROCHLOROTHIAZIDE 25 MG/1
25 TABLET ORAL DAILY
Qty: 90 TABLET | Refills: 1 | Status: SHIPPED | OUTPATIENT
Start: 2024-04-29

## 2024-07-03 ENCOUNTER — HOSPITAL ENCOUNTER (OUTPATIENT)
Facility: HOSPITAL | Age: 74
Setting detail: SPECIMEN
Discharge: HOME | End: 2024-07-06
Payer: MEDICARE

## 2024-07-03 DIAGNOSIS — N18.31 STAGE 3A CHRONIC KIDNEY DISEASE (HCC): ICD-10-CM

## 2024-07-03 DIAGNOSIS — I10 PRIMARY HYPERTENSION: ICD-10-CM

## 2024-07-03 LAB
ALBUMIN SERPL-MCNC: 3.4 G/DL (ref 3.4–5)
ALBUMIN/GLOB SERPL: 1 (ref 0.8–1.7)
ALP SERPL-CCNC: 88 U/L (ref 45–117)
ALT SERPL-CCNC: 34 U/L (ref 16–61)
ANION GAP SERPL CALC-SCNC: 5 MMOL/L (ref 3–18)
AST SERPL-CCNC: 20 U/L (ref 10–38)
BILIRUB SERPL-MCNC: 0.5 MG/DL (ref 0.2–1)
BUN SERPL-MCNC: 15 MG/DL (ref 7–18)
BUN/CREAT SERPL: 11 (ref 12–20)
CALCIUM SERPL-MCNC: 8.9 MG/DL (ref 8.5–10.1)
CHLORIDE SERPL-SCNC: 104 MMOL/L (ref 100–111)
CHOLEST SERPL-MCNC: 150 MG/DL
CO2 SERPL-SCNC: 28 MMOL/L (ref 21–32)
CREAT SERPL-MCNC: 1.35 MG/DL (ref 0.6–1.3)
CREAT UR-MCNC: 138 MG/DL (ref 30–125)
ERYTHROCYTE [DISTWIDTH] IN BLOOD BY AUTOMATED COUNT: 14.2 % (ref 11.6–14.5)
GLOBULIN SER CALC-MCNC: 3.4 G/DL (ref 2–4)
GLUCOSE SERPL-MCNC: 149 MG/DL (ref 74–99)
HCT VFR BLD AUTO: 45.7 % (ref 36–48)
HDLC SERPL-MCNC: 34 MG/DL (ref 40–60)
HDLC SERPL: 4.4 (ref 0–5)
HGB BLD-MCNC: 14.3 G/DL (ref 13–16)
LDLC SERPL CALC-MCNC: 93 MG/DL (ref 0–100)
LIPID PANEL: ABNORMAL
MCH RBC QN AUTO: 27.4 PG (ref 24–34)
MCHC RBC AUTO-ENTMCNC: 31.3 G/DL (ref 31–37)
MCV RBC AUTO: 87.5 FL (ref 78–100)
MICROALBUMIN UR-MCNC: 0.7 MG/DL (ref 0–3)
MICROALBUMIN/CREAT UR-RTO: 5 MG/G (ref 0–30)
NRBC # BLD: 0 K/UL (ref 0–0.01)
NRBC BLD-RTO: 0 PER 100 WBC
PLATELET # BLD AUTO: 200 K/UL (ref 135–420)
PMV BLD AUTO: 11.1 FL (ref 9.2–11.8)
POTASSIUM SERPL-SCNC: 4.2 MMOL/L (ref 3.5–5.5)
PROT SERPL-MCNC: 6.8 G/DL (ref 6.4–8.2)
RBC # BLD AUTO: 5.22 M/UL (ref 4.35–5.65)
SODIUM SERPL-SCNC: 137 MMOL/L (ref 136–145)
TRIGL SERPL-MCNC: 115 MG/DL
VLDLC SERPL CALC-MCNC: 23 MG/DL
WBC # BLD AUTO: 4.6 K/UL (ref 4.6–13.2)

## 2024-07-03 PROCEDURE — 80053 COMPREHEN METABOLIC PANEL: CPT

## 2024-07-03 PROCEDURE — 36415 COLL VENOUS BLD VENIPUNCTURE: CPT

## 2024-07-03 PROCEDURE — 80061 LIPID PANEL: CPT

## 2024-07-03 PROCEDURE — 82570 ASSAY OF URINE CREATININE: CPT

## 2024-07-03 PROCEDURE — 82043 UR ALBUMIN QUANTITATIVE: CPT

## 2024-07-03 PROCEDURE — 85027 COMPLETE CBC AUTOMATED: CPT

## 2024-08-15 ENCOUNTER — OFFICE VISIT (OUTPATIENT)
Facility: CLINIC | Age: 74
End: 2024-08-15
Payer: MEDICARE

## 2024-08-15 VITALS
DIASTOLIC BLOOD PRESSURE: 76 MMHG | TEMPERATURE: 98.6 F | HEART RATE: 82 BPM | OXYGEN SATURATION: 94 % | WEIGHT: 193 LBS | BODY MASS INDEX: 35.51 KG/M2 | SYSTOLIC BLOOD PRESSURE: 146 MMHG | HEIGHT: 62 IN | RESPIRATION RATE: 18 BRPM

## 2024-08-15 DIAGNOSIS — I10 PRIMARY HYPERTENSION: Primary | ICD-10-CM

## 2024-08-15 PROCEDURE — G8427 DOCREV CUR MEDS BY ELIG CLIN: HCPCS | Performed by: NURSE PRACTITIONER

## 2024-08-15 PROCEDURE — G8417 CALC BMI ABV UP PARAM F/U: HCPCS | Performed by: NURSE PRACTITIONER

## 2024-08-15 PROCEDURE — 1036F TOBACCO NON-USER: CPT | Performed by: NURSE PRACTITIONER

## 2024-08-15 PROCEDURE — 3017F COLORECTAL CA SCREEN DOC REV: CPT | Performed by: NURSE PRACTITIONER

## 2024-08-15 PROCEDURE — 3078F DIAST BP <80 MM HG: CPT | Performed by: NURSE PRACTITIONER

## 2024-08-15 PROCEDURE — 3077F SYST BP >= 140 MM HG: CPT | Performed by: NURSE PRACTITIONER

## 2024-08-15 PROCEDURE — 1123F ACP DISCUSS/DSCN MKR DOCD: CPT | Performed by: NURSE PRACTITIONER

## 2024-08-15 PROCEDURE — 99213 OFFICE O/P EST LOW 20 MIN: CPT | Performed by: NURSE PRACTITIONER

## 2024-08-15 RX ORDER — FLUTICASONE PROPIONATE 50 MCG
2 SPRAY, SUSPENSION (ML) NASAL DAILY
Qty: 16 G | Refills: 3 | Status: SHIPPED | OUTPATIENT
Start: 2024-08-15

## 2024-08-15 ASSESSMENT — PATIENT HEALTH QUESTIONNAIRE - PHQ9
1. LITTLE INTEREST OR PLEASURE IN DOING THINGS: NOT AT ALL
SUM OF ALL RESPONSES TO PHQ QUESTIONS 1-9: 0
2. FEELING DOWN, DEPRESSED OR HOPELESS: NOT AT ALL
SUM OF ALL RESPONSES TO PHQ9 QUESTIONS 1 & 2: 0

## 2024-08-15 NOTE — PROGRESS NOTES
Room 8     William Jolly Jr had concerns including Follow-up Chronic Condition and Hypertension. for today's visit .     1. \"Have you been to the ER, urgent care clinic since your last visit?  Hospitalized since your last visit?\" .NO    2. \"Have you seen or consulted any other health care providers outside of the [facility] since your last visit?\" No     3. For patients aged 45-75: Has the patient had a colonoscopy / FIT/ Cologuard? Yes      If the patient is female:    4. For patients aged 40-74: Has the patient had a mammogram within the past 2 years? N/A      5. For patients aged 21-65: Has the patient had a pap smear? {Cancer Care Gap present? N/A            2/8/2024     2:43 PM   Amb Fall Risk Assessment and TUG Test   Do you feel unsteady or are you worried about falling?  no   2 or more falls in past year? no   Fall with injury in past year? no              8/15/2024     3:27 PM   PHQ-9    Little interest or pleasure in doing things 0   Feeling down, depressed, or hopeless 0   PHQ-2 Score 0   PHQ-9 Total Score 0          Health Maintenance Due   Topic Date Due    Shingles vaccine (1 of 2) Never done    Respiratory Syncytial Virus (RSV) Pregnant or age 60 yrs+ (1 - 1-dose 60+ series) Never done    AAA screen  Never done    COVID-19 Vaccine (3 - 2023-24 season) 09/01/2023    Flu vaccine (1) Never done

## 2024-08-15 NOTE — PROGRESS NOTES
[address] [place] [address]               [phone]      William Jolly Jr is a 73 y.o. male and presents with Follow-up Chronic Condition and Hypertension       Assessment/Plan:    1. Primary hypertension  Blood pressure borderline controlled, he is not feeling well today, no medication changes  Patient verbalized understanding and is in agreement with this plan of care         Follow up and disposition:   Return in about 3 months (around 11/15/2024) for MAWV, HTN, 30min, office.      Subjective:    Labs obtained prior to visit? Yes  Reviewed with patient? yes    Hypertension:  BP (!) 146/76   Pulse 82   Temp 98.6 °F (37 °C) (Temporal)   Resp 18   Ht 1.575 m (5' 2\")   Wt 87.5 kg (193 lb)   SpO2 94%   BMI 35.30 kg/m²    Patient reports taking medications as instructed. Yes   Medication side effects noted no  Headache upon wakening.no   Home BP monitoring: yes, 140/70  Do you experience chest pain/pressure or SOB with exertion? no  Maintain a low Sodium diet? Yes  Lab Results   Component Value Date/Time    BUN 15 07/03/2024 08:22 AM    CREATININE 1.35 07/03/2024 08:22 AM    LABGLOM 55 07/03/2024 08:22 AM    LABGLOM 52 02/08/2024 03:25 PM    LABGLOM 54 01/31/2023 08:09 AM    K 4.2 07/03/2024 08:22 AM     Hypertension Medications       ACE Inhibitors       lisinopril (PRINIVIL;ZESTRIL) 40 MG tablet take 1 tablet by mouth once daily       Calcium Channel Blockers       amLODIPine (NORVASC) 5 MG tablet Take 1 tablet by mouth daily       Thiazides and Thiazide-Like Diuretics       hydroCHLOROthiazide (HYDRODIURIL) 25 MG tablet take 1 tablet by mouth once daily               ROS:     Review of Systems  As stated in HPI, otherwise all others negative.       The problem list was updated as a part of today's visit.  Patient Active Problem List   Diagnosis    Stage 3a chronic kidney disease (HCC)    GERD (gastroesophageal reflux disease)    Personal history of tobacco use,

## 2024-10-17 DIAGNOSIS — K21.9 GASTROESOPHAGEAL REFLUX DISEASE, UNSPECIFIED WHETHER ESOPHAGITIS PRESENT: ICD-10-CM

## 2024-10-17 DIAGNOSIS — I10 PRIMARY HYPERTENSION: ICD-10-CM

## 2024-10-17 RX ORDER — HYDROCHLOROTHIAZIDE 25 MG/1
25 TABLET ORAL DAILY
Qty: 90 TABLET | Refills: 1 | Status: SHIPPED | OUTPATIENT
Start: 2024-10-17

## 2024-10-17 RX ORDER — OMEPRAZOLE 40 MG/1
CAPSULE, DELAYED RELEASE ORAL DAILY
Qty: 90 CAPSULE | Refills: 1 | Status: SHIPPED | OUTPATIENT
Start: 2024-10-17

## 2024-10-17 RX ORDER — LISINOPRIL 40 MG/1
40 TABLET ORAL DAILY
Qty: 90 TABLET | Refills: 1 | Status: SHIPPED | OUTPATIENT
Start: 2024-10-17

## 2024-11-12 ENCOUNTER — TELEPHONE (OUTPATIENT)
Facility: CLINIC | Age: 74
End: 2024-11-12

## 2024-11-14 RX ORDER — LIDOCAINE 50 MG/G
3 PATCH TOPICAL DAILY
Qty: 30 PATCH | Refills: 3 | Status: CANCELLED | OUTPATIENT
Start: 2024-11-14

## 2024-11-14 RX ORDER — LIDOCAINE 50 MG/G
3 PATCH TOPICAL DAILY
Qty: 30 PATCH | Refills: 3 | Status: SHIPPED | OUTPATIENT
Start: 2024-11-14

## 2024-11-20 ENCOUNTER — OFFICE VISIT (OUTPATIENT)
Facility: CLINIC | Age: 74
End: 2024-11-20
Payer: MEDICARE

## 2024-11-20 VITALS
SYSTOLIC BLOOD PRESSURE: 130 MMHG | TEMPERATURE: 98.2 F | RESPIRATION RATE: 18 BRPM | WEIGHT: 197 LBS | OXYGEN SATURATION: 92 % | HEART RATE: 71 BPM | DIASTOLIC BLOOD PRESSURE: 84 MMHG | BODY MASS INDEX: 36.25 KG/M2 | HEIGHT: 62 IN

## 2024-11-20 DIAGNOSIS — N18.31 STAGE 3A CHRONIC KIDNEY DISEASE (HCC): ICD-10-CM

## 2024-11-20 DIAGNOSIS — I10 PRIMARY HYPERTENSION: ICD-10-CM

## 2024-11-20 DIAGNOSIS — R73.09 ELEVATED GLUCOSE: ICD-10-CM

## 2024-11-20 DIAGNOSIS — Z00.00 MEDICARE ANNUAL WELLNESS VISIT, SUBSEQUENT: Primary | ICD-10-CM

## 2024-11-20 DIAGNOSIS — M54.42 CHRONIC LEFT-SIDED LOW BACK PAIN WITH LEFT-SIDED SCIATICA: ICD-10-CM

## 2024-11-20 DIAGNOSIS — G89.29 CHRONIC LEFT-SIDED LOW BACK PAIN WITH LEFT-SIDED SCIATICA: ICD-10-CM

## 2024-11-20 PROCEDURE — G0439 PPPS, SUBSEQ VISIT: HCPCS | Performed by: NURSE PRACTITIONER

## 2024-11-20 PROCEDURE — 1159F MED LIST DOCD IN RCRD: CPT | Performed by: NURSE PRACTITIONER

## 2024-11-20 PROCEDURE — G8484 FLU IMMUNIZE NO ADMIN: HCPCS | Performed by: NURSE PRACTITIONER

## 2024-11-20 PROCEDURE — 3017F COLORECTAL CA SCREEN DOC REV: CPT | Performed by: NURSE PRACTITIONER

## 2024-11-20 PROCEDURE — 3078F DIAST BP <80 MM HG: CPT | Performed by: NURSE PRACTITIONER

## 2024-11-20 PROCEDURE — 1123F ACP DISCUSS/DSCN MKR DOCD: CPT | Performed by: NURSE PRACTITIONER

## 2024-11-20 PROCEDURE — 3075F SYST BP GE 130 - 139MM HG: CPT | Performed by: NURSE PRACTITIONER

## 2024-11-20 PROCEDURE — 1160F RVW MEDS BY RX/DR IN RCRD: CPT | Performed by: NURSE PRACTITIONER

## 2024-11-20 RX ORDER — MELOXICAM 15 MG/1
15 TABLET ORAL DAILY
Qty: 90 TABLET | Refills: 1 | Status: SHIPPED | OUTPATIENT
Start: 2024-11-20

## 2024-11-20 ASSESSMENT — PATIENT HEALTH QUESTIONNAIRE - PHQ9
SUM OF ALL RESPONSES TO PHQ QUESTIONS 1-9: 0
2. FEELING DOWN, DEPRESSED OR HOPELESS: NOT AT ALL
SUM OF ALL RESPONSES TO PHQ9 QUESTIONS 1 & 2: 0
SUM OF ALL RESPONSES TO PHQ QUESTIONS 1-9: 0
1. LITTLE INTEREST OR PLEASURE IN DOING THINGS: NOT AT ALL

## 2024-11-20 ASSESSMENT — LIFESTYLE VARIABLES
HOW MANY STANDARD DRINKS CONTAINING ALCOHOL DO YOU HAVE ON A TYPICAL DAY: PATIENT DOES NOT DRINK
HOW OFTEN DO YOU HAVE A DRINK CONTAINING ALCOHOL: NEVER

## 2024-11-20 NOTE — PROGRESS NOTES
Medicare Annual Wellness Visit    William Jolly Jr is here for Medicare AWV, Follow-up Chronic Condition, and Hypertension    Assessment & Plan   Medicare annual wellness visit, subsequent  Primary hypertension  -     Comprehensive Metabolic Panel; Future  -     CBC; Future  Chronic left-sided low back pain with left-sided sciatica  -     meloxicam (MOBIC) 15 MG tablet; Take 1 tablet by mouth daily, Disp-90 tablet, R-1Normal  Stage 3a chronic kidney disease (HCC)  -     Microalbumin / Creatinine Urine Ratio; Future  Elevated glucose  -     Hemoglobin A1C; Future  MAWV completed, HC decision maker verified, ACP discussion completed  Blood pressure controlled, continue amlodipine, hctz, lisinopril at same dose  Refills provided  Labs prior to next visit  Patient verbalized understanding and is in agreement with this plan of care          Recommendations for Preventive Services Due: see orders and patient instructions/AVS.  Recommended screening schedule for the next 5-10 years is provided to the patient in written form: see Patient Instructions/AVS.         Return in about 6 months (around 5/20/2025) for HTN, 15min, office, w/labsprior.         Subjective   The following acute and/or chronic problems were also addressed today:  Hypertension:  BP (!) 149/72   Pulse 71   Temp 98.2 °F (36.8 °C) (Temporal)   Resp 18   Ht 1.575 m (5' 2\")   Wt 89.4 kg (197 lb)   SpO2 92%   BMI 36.03 kg/m²    Patient reports taking medications as instructed. Yes   Medication side effects noted no  Headache upon wakening.no   Home BP monitoring: yes, 130/70  Do you experience chest pain/pressure or SOB with exertion? no  Maintain a low Sodium diet? Yes  Lab Results   Component Value Date/Time    BUN 15 07/03/2024 08:22 AM    CREATININE 1.35 07/03/2024 08:22 AM    LABGLOM 55 07/03/2024 08:22 AM    LABGLOM 52 02/08/2024 03:25 PM    LABGLOM 54 01/31/2023 08:09 AM    K 4.2 07/03/2024 08:22 AM     Hypertension Medications       ACE

## 2024-11-20 NOTE — ACP (ADVANCE CARE PLANNING)
Advance Care Planning   General Advance Care Planning (ACP) Conversation    Date of Conversation: 11/20/2024  Conducted with: Patient with Decision Making Capacity  Other persons present: None    Healthcare Decision Maker:    Primary Decision Maker: Chris Hardena - Aimee - [phone]    Today we documented Decision Maker(s) consistent with Legal Next of Kin hierarchy.  Content/Action Overview:  Has NO ACP documents-Information provided  Reviewed DNR/DNI and patient elects not to decide at this time  resuscitation preferences      Length of Voluntary ACP Conversation in minutes:  <16 minutes (Non-Billable)    Francine Oviedo, APRN - CNP

## 2024-11-20 NOTE — PROGRESS NOTES
Room 7      William Jolly Jr had concerns including Medicare AWV, Follow-up Chronic Condition, and Hypertension. for today's visit .     When asked if patient has any concerns he  would like to address with JAIRON Oviedo patient states I would like to discuss what the back specialist wants to do for my back  . JAIRON Oviedo has been notified  of patient concerns .        1. \"Have you been to the ER, urgent care clinic since your last visit?  Hospitalized since your last visit?\" .NO    2. \"Have you seen or consulted any other health care providers outside of the [facility] since your last visit?\" No     3. For patients aged 45-75: Has the patient had a colonoscopy / FIT/ Cologuard? Yes      If the patient is female:    4. For patients aged 40-74: Has the patient had a mammogram within the past 2 years? N/A      5. For patients aged 21-65: Has the patient had a pap smear? {Cancer Care Gap present? N/a          11/20/2024     8:05 AM   Amb Fall Risk Assessment and TUG Test   Do you feel unsteady or are you worried about falling?  no   2 or more falls in past year? no   Fall with injury in past year? no              11/20/2024     8:05 AM   PHQ-9    Little interest or pleasure in doing things 0   Feeling down, depressed, or hopeless 0   PHQ-2 Score 0   PHQ-9 Total Score 0          Health Maintenance Due   Topic Date Due    Shingles vaccine (1 of 2) Never done    AAA screen  Never done    Flu vaccine (1) Never done    COVID-19 Vaccine (3 - 2023-24 season) 09/01/2024    Annual Wellness Visit (Medicare)  10/24/2024

## 2025-01-07 ENCOUNTER — TELEPHONE (OUTPATIENT)
Facility: CLINIC | Age: 75
End: 2025-01-07

## 2025-01-07 NOTE — TELEPHONE ENCOUNTER
called and stated that his refill for   amLODIPine (NORVASC) 5 MG tablet has not been able to refilled due to the pharmacy being unable to get in touch to get an approval for the refill and stated for him to reach out about it. Thank you

## 2025-01-08 DIAGNOSIS — I10 PRIMARY HYPERTENSION: ICD-10-CM

## 2025-01-08 RX ORDER — AMLODIPINE BESYLATE 5 MG/1
5 TABLET ORAL DAILY
Qty: 90 TABLET | Refills: 1 | Status: SHIPPED | OUTPATIENT
Start: 2025-01-08

## 2025-05-13 ENCOUNTER — HOSPITAL ENCOUNTER (OUTPATIENT)
Facility: HOSPITAL | Age: 75
Setting detail: SPECIMEN
Discharge: HOME | End: 2025-05-16
Payer: MEDICARE

## 2025-05-13 DIAGNOSIS — I10 PRIMARY HYPERTENSION: ICD-10-CM

## 2025-05-13 DIAGNOSIS — N18.31 STAGE 3A CHRONIC KIDNEY DISEASE (HCC): ICD-10-CM

## 2025-05-13 DIAGNOSIS — R73.09 ELEVATED GLUCOSE: ICD-10-CM

## 2025-05-13 LAB
ALBUMIN SERPL-MCNC: 3.4 G/DL (ref 3.4–5)
ALBUMIN/GLOB SERPL: 1.2 (ref 0.8–1.7)
ALP SERPL-CCNC: 82 U/L (ref 45–117)
ALT SERPL-CCNC: 23 U/L (ref 10–50)
ANION GAP SERPL CALC-SCNC: 10 MMOL/L (ref 3–18)
AST SERPL-CCNC: 24 U/L (ref 10–38)
BILIRUB SERPL-MCNC: 0.4 MG/DL (ref 0.2–1)
BUN SERPL-MCNC: 19 MG/DL (ref 6–23)
BUN/CREAT SERPL: 15 (ref 12–20)
CALCIUM SERPL-MCNC: 9.2 MG/DL (ref 8.5–10.1)
CHLORIDE SERPL-SCNC: 103 MMOL/L (ref 98–107)
CO2 SERPL-SCNC: 27 MMOL/L (ref 21–32)
CREAT SERPL-MCNC: 1.32 MG/DL (ref 0.6–1.3)
CREAT UR-MCNC: 161 MG/DL (ref 30–125)
ERYTHROCYTE [DISTWIDTH] IN BLOOD BY AUTOMATED COUNT: 14.2 % (ref 11.6–14.5)
EST. AVERAGE GLUCOSE BLD GHB EST-MCNC: 128 MG/DL
GLOBULIN SER CALC-MCNC: 2.8 G/DL (ref 2–4)
GLUCOSE SERPL-MCNC: 99 MG/DL (ref 74–108)
HBA1C MFR BLD: 6.1 % (ref 4.2–5.6)
HCT VFR BLD AUTO: 46.9 % (ref 36–48)
HGB BLD-MCNC: 14.1 G/DL (ref 13–16)
MCH RBC QN AUTO: 26.3 PG (ref 24–34)
MCHC RBC AUTO-ENTMCNC: 30.1 G/DL (ref 31–37)
MCV RBC AUTO: 87.5 FL (ref 78–100)
MICROALBUMIN UR-MCNC: <1.2 MG/DL (ref 0–3)
MICROALBUMIN/CREAT UR-RTO: ABNORMAL MG/G (ref 0–30)
NRBC # BLD: 0 K/UL (ref 0–0.01)
NRBC BLD-RTO: 0 PER 100 WBC
PLATELET # BLD AUTO: 206 K/UL (ref 135–420)
PMV BLD AUTO: 11.3 FL (ref 9.2–11.8)
POTASSIUM SERPL-SCNC: 4.3 MMOL/L (ref 3.5–5.5)
PROT SERPL-MCNC: 6.2 G/DL (ref 6.4–8.2)
RBC # BLD AUTO: 5.36 M/UL (ref 4.35–5.65)
SODIUM SERPL-SCNC: 140 MMOL/L (ref 136–145)
WBC # BLD AUTO: 5 K/UL (ref 4.6–13.2)

## 2025-05-13 PROCEDURE — 82043 UR ALBUMIN QUANTITATIVE: CPT

## 2025-05-13 PROCEDURE — 83036 HEMOGLOBIN GLYCOSYLATED A1C: CPT

## 2025-05-13 PROCEDURE — 82570 ASSAY OF URINE CREATININE: CPT

## 2025-05-13 PROCEDURE — 80053 COMPREHEN METABOLIC PANEL: CPT

## 2025-05-13 PROCEDURE — 36415 COLL VENOUS BLD VENIPUNCTURE: CPT

## 2025-05-13 PROCEDURE — 85027 COMPLETE CBC AUTOMATED: CPT

## 2025-05-20 ENCOUNTER — OFFICE VISIT (OUTPATIENT)
Facility: CLINIC | Age: 75
End: 2025-05-20
Payer: MEDICARE

## 2025-05-20 VITALS
HEIGHT: 62 IN | HEART RATE: 61 BPM | TEMPERATURE: 98.5 F | DIASTOLIC BLOOD PRESSURE: 78 MMHG | SYSTOLIC BLOOD PRESSURE: 132 MMHG | WEIGHT: 197 LBS | OXYGEN SATURATION: 94 % | BODY MASS INDEX: 36.25 KG/M2 | RESPIRATION RATE: 16 BRPM

## 2025-05-20 DIAGNOSIS — R73.03 PREDIABETES: ICD-10-CM

## 2025-05-20 DIAGNOSIS — I10 PRIMARY HYPERTENSION: ICD-10-CM

## 2025-05-20 DIAGNOSIS — J32.4 CHRONIC PANSINUSITIS: ICD-10-CM

## 2025-05-20 DIAGNOSIS — N18.31 STAGE 3A CHRONIC KIDNEY DISEASE (HCC): Primary | ICD-10-CM

## 2025-05-20 PROCEDURE — 1123F ACP DISCUSS/DSCN MKR DOCD: CPT | Performed by: NURSE PRACTITIONER

## 2025-05-20 PROCEDURE — 99214 OFFICE O/P EST MOD 30 MIN: CPT | Performed by: NURSE PRACTITIONER

## 2025-05-20 PROCEDURE — 1159F MED LIST DOCD IN RCRD: CPT | Performed by: NURSE PRACTITIONER

## 2025-05-20 PROCEDURE — G8427 DOCREV CUR MEDS BY ELIG CLIN: HCPCS | Performed by: NURSE PRACTITIONER

## 2025-05-20 PROCEDURE — 1160F RVW MEDS BY RX/DR IN RCRD: CPT | Performed by: NURSE PRACTITIONER

## 2025-05-20 PROCEDURE — 1036F TOBACCO NON-USER: CPT | Performed by: NURSE PRACTITIONER

## 2025-05-20 PROCEDURE — 3075F SYST BP GE 130 - 139MM HG: CPT | Performed by: NURSE PRACTITIONER

## 2025-05-20 PROCEDURE — G8417 CALC BMI ABV UP PARAM F/U: HCPCS | Performed by: NURSE PRACTITIONER

## 2025-05-20 PROCEDURE — 3017F COLORECTAL CA SCREEN DOC REV: CPT | Performed by: NURSE PRACTITIONER

## 2025-05-20 PROCEDURE — 3078F DIAST BP <80 MM HG: CPT | Performed by: NURSE PRACTITIONER

## 2025-05-20 RX ORDER — HYDROCHLOROTHIAZIDE 25 MG/1
25 TABLET ORAL DAILY
Qty: 90 TABLET | Refills: 1 | Status: SHIPPED | OUTPATIENT
Start: 2025-05-20

## 2025-05-20 RX ORDER — LISINOPRIL 40 MG/1
40 TABLET ORAL DAILY
Qty: 90 TABLET | Refills: 1 | Status: SHIPPED | OUTPATIENT
Start: 2025-05-20

## 2025-05-20 RX ORDER — AMLODIPINE BESYLATE 5 MG/1
5 TABLET ORAL DAILY
Qty: 90 TABLET | Refills: 1 | Status: SHIPPED | OUTPATIENT
Start: 2025-05-20

## 2025-05-20 RX ORDER — FLUTICASONE PROPIONATE 50 MCG
2 SPRAY, SUSPENSION (ML) NASAL DAILY
Qty: 16 G | Refills: 3 | Status: SHIPPED | OUTPATIENT
Start: 2025-05-20

## 2025-05-20 SDOH — ECONOMIC STABILITY: FOOD INSECURITY: WITHIN THE PAST 12 MONTHS, YOU WORRIED THAT YOUR FOOD WOULD RUN OUT BEFORE YOU GOT MONEY TO BUY MORE.: NEVER TRUE

## 2025-05-20 SDOH — ECONOMIC STABILITY: FOOD INSECURITY: WITHIN THE PAST 12 MONTHS, THE FOOD YOU BOUGHT JUST DIDN'T LAST AND YOU DIDN'T HAVE MONEY TO GET MORE.: NEVER TRUE

## 2025-05-20 ASSESSMENT — PATIENT HEALTH QUESTIONNAIRE - PHQ9
2. FEELING DOWN, DEPRESSED OR HOPELESS: SEVERAL DAYS
SUM OF ALL RESPONSES TO PHQ QUESTIONS 1-9: 1
1. LITTLE INTEREST OR PLEASURE IN DOING THINGS: NOT AT ALL
SUM OF ALL RESPONSES TO PHQ QUESTIONS 1-9: 1

## 2025-05-20 NOTE — PROGRESS NOTES
[address] [place] [address]               [phone]      William Jolly Jr is a 74 y.o. male and presents with Follow-up Chronic Condition (6 month ; HTN)       Assessment/Plan:    1. Stage 3a chronic kidney disease (HCC)  Assessment & Plan:   Chronic, at goal (stable), continue current treatment plan  Orders:  -     Lipid Panel; Future  2. Primary hypertension  -     lisinopril (PRINIVIL;ZESTRIL) 40 MG tablet; Take 1 tablet by mouth daily, Disp-90 tablet, R-1Normal  -     hydroCHLOROthiazide (HYDRODIURIL) 25 MG tablet; Take 1 tablet by mouth daily, Disp-90 tablet, R-1Normal  -     amLODIPine (NORVASC) 5 MG tablet; Take 1 tablet by mouth daily, Disp-90 tablet, R-1Normal  -     Comprehensive Metabolic Panel; Future  -     CBC; Future  3. Prediabetes  -     Hemoglobin A1C; Future  4. Chronic pansinusitis  -     fluticasone (FLONASE) 50 MCG/ACT nasal spray; 2 sprays by Nasal route daily, Disp-16 g, R-3Normal     Assessment & Plan  1. Hypertension.  - Blood pressure readings have been consistently within the normal range, with today's reading at 132/78 mmHg.  - Currently taking amlodipine 5 mg, hydrochlorothiazide 25 mg, and lisinopril 40 mg daily.  - Advised to continue current medication regimen and maintain a low-salt diet.  - Refills for amlodipine, hydrochlorothiazide, and lisinopril have been provided and sent to the pharmacy.    2. Prediabetes.  - Hemoglobin A1c level is 6.1, indicating prediabetes.  - Advised to monitor diet, particularly sugar and carbohydrate intake, and to stay active to prevent progression to diabetes.  - If A1c reaches 6.4 or higher, medication will be considered.  - Will keep an eye on the condition to ensure it does not worsen.    3. Medication management.  - No longer taking meclizine or meloxicam.  - Requested a refill for Flonase nasal spray.  - Prescription for Flonase has been sent to the pharmacy.    Follow-up  - Follow up in 6 months

## 2025-05-21 DIAGNOSIS — K21.9 GASTROESOPHAGEAL REFLUX DISEASE, UNSPECIFIED WHETHER ESOPHAGITIS PRESENT: ICD-10-CM

## 2025-05-21 RX ORDER — OMEPRAZOLE 40 MG/1
CAPSULE, DELAYED RELEASE ORAL DAILY
Qty: 90 CAPSULE | Refills: 1 | Status: SHIPPED | OUTPATIENT
Start: 2025-05-21